# Patient Record
Sex: FEMALE | Race: WHITE | NOT HISPANIC OR LATINO | Employment: OTHER | URBAN - METROPOLITAN AREA
[De-identification: names, ages, dates, MRNs, and addresses within clinical notes are randomized per-mention and may not be internally consistent; named-entity substitution may affect disease eponyms.]

---

## 2017-01-09 DIAGNOSIS — G47.00 INSOMNIA, UNSPECIFIED TYPE: ICD-10-CM

## 2017-01-09 RX ORDER — ESZOPICLONE 3 MG/1
TABLET, FILM COATED ORAL
Qty: 30 TAB | Refills: 2 | Status: SHIPPED
Start: 2017-01-09 | End: 2017-03-03 | Stop reason: SDUPTHER

## 2017-01-09 NOTE — TELEPHONE ENCOUNTER
Please complete your note as I cannot close the encounter. You started this message over an hour ago.

## 2017-01-09 NOTE — TELEPHONE ENCOUNTER
Caller Name: Acacia Arboleda     Call Back Number: [phone] (home)     Patient approves a detailed voicemail message: yes     Have we ever prescribed this med? Yes. If yes, what date? 10/17/16     Last OV: 5/16/16 - Dr. Erickson    Next OV: 01/23/2017 - Angelic Leone    DX: insomnia

## 2017-01-09 NOTE — TELEPHONE ENCOUNTER
Caller Name: Acacia Arboleda                 Call Back Number: [phone] (home)         Patient approves a detailed voicemail message: yes    Have we ever prescribed this med? Yes.  If yes, what date? 10/17/16    Last OV: 5/16/16    Next OV: 1/23/17    DX: insomnia    Medications:  Current Outpatient Prescriptions   Medication Sig Dispense Refill   • DOCUSATE SODIUM PO Take  by mouth.     • atorvastatin (LIPITOR) 10 MG Tab Take 1 Tab by mouth every day. 90 Tab 0   • levothyroxine (SYNTHROID) 175 MCG Tab Take 1 Tab by mouth every day. 90 Tab 1   • Eszopiclone 3 MG Tab TAKE ONE TABLET BY MOUTH EVERY NIGHT AT BEDTIME AS NEEDED FOR SLEEP 30 Tab 2   • acyclovir (ZOVIRAX) 400 MG tablet Take 1 Tab by mouth 3 times a day. 21 Tab 0   • Benzocaine-Benzethonium 20-0.2 % Aerosol Apply as needed 1 Can 3   • loratadine (CLARITIN) 10 MG TABS Take 10 mg by mouth every day.     • MAGNESIUM PO Take  by mouth.     • Multiple Vitamins-Minerals (WOMENS 50+ MULTI VITAMIN/MIN PO) Take  by mouth.     • ascorbic acid (ASCORBIC ACID) 500 MG TABS Take 500 mg by mouth every day.     • VITAMIN E by Does not apply route.     • docosahexanoic acid (OMEGA 3 FA) 1000 MG CAPS Take 1,000 mg by mouth 3 times a day, with meals.     • Calcium Carbonate-Vitamin D (CALCIUM + D PO) Take  by mouth.     • Multiple Vitamins-Minerals (OCUVITE) TABS Take 1 Tab by mouth every day.     • Coenzyme Q10 (CO Q 10) 10 MG CAPS Take  by mouth.     • Cyanocobalamin (VITAMIN B12 PO) Take  by mouth.     • Naproxen Sodium (ALEVE PO) Take  by mouth.     • mometasone (NASONEX) 50 MCG/ACT nasal spray Spray 2 Sprays in nose every day. Each nostril 1 Inhaler 2   • NON SPECIFIED DIABETIC TEST STRIP (ONE TOUCH ULTRA) TEST 2-3 TIMES DAILY        No current facility-administered medications for this visit.

## 2017-02-10 ENCOUNTER — SLEEP CENTER VISIT (OUTPATIENT)
Dept: SLEEP MEDICINE | Facility: MEDICAL CENTER | Age: 62
End: 2017-02-10
Payer: COMMERCIAL

## 2017-02-10 VITALS
WEIGHT: 197 LBS | BODY MASS INDEX: 32.82 KG/M2 | HEIGHT: 65 IN | DIASTOLIC BLOOD PRESSURE: 72 MMHG | RESPIRATION RATE: 16 BRPM | SYSTOLIC BLOOD PRESSURE: 110 MMHG | HEART RATE: 77 BPM | OXYGEN SATURATION: 94 %

## 2017-02-10 DIAGNOSIS — F51.04 PSYCHOPHYSIOLOGICAL INSOMNIA: ICD-10-CM

## 2017-02-10 DIAGNOSIS — G47.33 OBSTRUCTIVE SLEEP APNEA: ICD-10-CM

## 2017-02-10 PROCEDURE — 99213 OFFICE O/P EST LOW 20 MIN: CPT | Performed by: INTERNAL MEDICINE

## 2017-02-10 NOTE — MR AVS SNAPSHOT
"        Acacia Arboleda   2/10/2017 11:20 AM   Sleep Center Visit   MRN: [id number]    Department:  [facility]   Dept Phone:  [phone]    Description:  Female : 1955   Provider:  Tere Erickson M.D.           Reason for Visit     Follow-Up JAYSHREE      Allergies as of 2/10/2017     Allergen Noted Reactions    Sulfa Drugs 2009         You were diagnosed with     Obstructive sleep apnea   [625530]       Psychophysiological insomnia   [412715]         Vital Signs     Blood Pressure Pulse Respirations Height Weight Body Mass Index    110/72 mmHg 77 16 1.651 m (5' 5\") 89.359 kg (197 lb) 32.78 kg/m2    Oxygen Saturation Last Menstrual Period Smoking Status             94% 1985 Former Smoker         Basic Information     Date Of Birth Sex Race Ethnicity Preferred Language    1955 Female White Non- English      Your appointments     Aug 16, 2017  1:20 PM   Follow UP with Tere Erickson M.D.   [facility] (--)    [address] [place] [id number]   [phone]              Problem List              ICD-10-CM Priority Class Noted - Resolved    Dyslipidemia E78.5   2009 - Present    Hypothyroid E03.9   2009 - Present    IBS (irritable bowel syndrome) K58.9   2009 - Present    GERD (gastroesophageal reflux disease) K21.9   2009 - Present    Herpes B00.9   2009 - Present    CHRONIC SINUSITIS    2009 - Present    Snoring R06.83   2013 - Present    Labial cyst N90.7   10/19/2015 - Present    Obstructive sleep apnea G47.33   2016 - Present    Insomnia G47.00   2016 - Present      Health Maintenance        Date Due Completion Dates    IMM ZOSTER VACCINE 3/11/2016 ---    MAMMOGRAM 2017, 10/2/2015, 2014, 2013, 2012, 2011, 2010, 9/10/2009, 9/10/2009, 2008, 2008, 2007, 2007, 10/7/2005, 10/6/2004    COLONOSCOPY 2021, " 11/19/2011 (Done)    Override on 11/19/2011: Done    IMM DTaP/Tdap/Td Vaccine (2 - Td) 10/6/2024 10/6/2014            Current Immunizations     Hepatitis A Vaccine, Adult 5/8/2015, 10/6/2014    Hepatitis B Vaccine Recombivax (Adol/Adult) 5/8/2015, 12/8/2014, 10/6/2014    Influenza Vaccine Quad Inj (Pf) 10/6/2014    Influenza Vaccine Quad Inj (Preserved) 10/26/2016, 10/19/2015    Tdap Vaccine 10/6/2014      Below and/or attached are the medications your provider expects you to take. Review all of your home medications and newly ordered medications with your provider and/or pharmacist. Follow medication instructions as directed by your provider and/or pharmacist. Please keep your medication list with you and share with your provider. Update the information when medications are discontinued, doses are changed, or new medications (including over-the-counter products) are added; and carry medication information at all times in the event of emergency situations     Allergies:  SULFA DRUGS - (reactions not documented)               Medications  Valid as of: February 10, 2017 - 11:56 AM    Generic Name Brand Name Tablet Size Instructions for use    Acyclovir (Tab) ZOVIRAX 400 MG Take 1 Tab by mouth 3 times a day.        Ascorbic Acid (Tab) ascorbic acid 500 MG Take 500 mg by mouth every day.        Atorvastatin Calcium (Tab) LIPITOR 10 MG Take 1 Tab by mouth every day.        Benzocaine-Benzethonium (Aerosol) Benzocaine-Benzethonium 20-0.2 % Apply as needed        Calcium Citrate-Vitamin D   Take  by mouth.        Coenzyme Q10 (Cap) Coenzyme Q10 10 MG Take  by mouth.        Cyanocobalamin   Take  by mouth.        Docusate Sodium   Take  by mouth.        Eszopiclone (Tab) Eszopiclone 3 MG TAKE ONE TABLET BY MOUTH AT BEDTIME AS NEEDED FOR SLEEP        Levothyroxine Sodium (Tab) SYNTHROID 175 MCG Take 1 Tab by mouth every day.        Loratadine (Tab) CLARITIN 10 MG Take 10 mg by mouth every day.        Magnesium   Take  by  mouth.        Mometasone Furoate (Suspension) NASONEX 50 MCG/ACT Spray 2 Sprays in nose every day. Each nostril        Multiple Vitamins-Minerals   Take  by mouth.        Multiple Vitamins-Minerals (Tab) OCUVITE  Take 1 Tab by mouth every day.        Naproxen Sodium   Take  by mouth.        NON SPECIFIED   DIABETIC TEST STRIP (ONE TOUCH ULTRA) TEST 2-3 TIMES DAILY         Omega-3 Fatty Acids (Cap) OMEGA 3 FA 1000 MG Take 1,000 mg by mouth 3 times a day, with meals.        Vitamin E   by Does not apply route.        .                 Medicines prescribed today were sent to:     [id number] - [place], [place] - [address]    [address] [place] [address]    Phone: [phone] Fax: [phone]    Open 24 Hours?: No    [facility] - [place], [place] - [address] AT PORTAL TO REG[address]ED [facility]    [address] [facility] [address]    Phone: [phone] Fax: [phone]    Open 24 Hours?: No      Medication refill instructions:       If your prescription bottle indicates you have medication refills left, it is not necessary to call your provider’s office. Please contact your pharmacy and they will refill your medication.    If your prescription bottle indicates you do not have any refills left, you may request refills at any time through one of the following ways: The online [employer] system (except Urgent Care), by calling your provider’s office, or by asking your pharmacy to contact your provider’s office with a refill request. Medication refills are processed only during regular business hours and may not be available until the next business day. Your provider may request additional information or to have a follow-up visit with you prior to refilling your medication.   *Please Note: Medication refills are assigned a new Rx number when refilled electronically. Your pharmacy may indicate that no refills were authorized even though a new prescription for the same  medication is available at the pharmacy. Please request the medicine by name with the pharmacy before contacting your provider for a refill.           MyChart Access Code: Activation code not generated  Current MyChart Status: Active

## 2017-02-10 NOTE — PROGRESS NOTES
Chief Complaint   Patient presents with   • Follow-Up     JAYSHREE       HPI: This patient is a 61 y.o. Female returns for follow-up of severe JAYSHREE and insomnia. Her polysomnogram showed AHI of 85 events per hour; she has been compliant with BiPAP 16/14 cm of water, with normal AHI of 2.8 per compliance card. She has 97% BiPAP usage for 6 hours nightly. She is seeing Dr. Vail for cognitive behavioral therapy and has been using sleep restriction therapy to help with her insomnia. She admits her sleep has improved with treatment. She continues to use Lunesta 3 mg daily at bedtime as a sleep aid.  Weight is down 13 pounds. Denies daytime hypersomnolence. She is using nasal pillows, denies difficulty with mask fit or leakage, however is overdue for replacement supplies. She is looking into switching DME's.   She has tried Ambien and Belsomra in the past without subjective benefit.    Past Medical History   Diagnosis Date   • Dyslipidemia 9/11/2009   • Herpes 9/11/2009   • CHRONIC SINUSITIS 9/11/2009   • IBS (irritable bowel syndrome) 9/11/2009   • GERD (gastroesophageal reflux disease) 9/11/2009   • Hypothyroid 9/11/2009   • Snoring 11/19/2013   • Arthritis    • Asthma    • Hemorrhoids    • Diabetes (CMS-[facility])    • Allergic rhinitis        Social History     Social History   • Marital Status:      Spouse Name: N/A   • Number of Children: N/A   • Years of Education: N/A     Occupational History   • Not on file.     Social History Main Topics   • Smoking status: Former Smoker -- 3.00 packs/day     Types: Cigarettes   • Smokeless tobacco: Never Used      Comment: QUIT 1986   • Alcohol Use: 0.0 oz/week     0 Standard drinks or equivalent per week      Comment: RARE   • Drug Use: No   • Sexual Activity:     Partners: Male     Other Topics Concern   • Not on file     Social History Narrative       Family History   Problem Relation Age of Onset   • Heart Disease Father      AAA   • Cancer Mother      UTERINE   • Heart  Disease Mother      AFIB   • Cancer Maternal Aunt      BREAST   • Cancer Maternal Uncle      COLON       Current Outpatient Prescriptions on File Prior to Visit   Medication Sig Dispense Refill   • Eszopiclone 3 MG Tab TAKE ONE TABLET BY MOUTH AT BEDTIME AS NEEDED FOR SLEEP 30 Tab 2   • DOCUSATE SODIUM PO Take  by mouth.     • levothyroxine (SYNTHROID) 175 MCG Tab Take 1 Tab by mouth every day. 90 Tab 1   • loratadine (CLARITIN) 10 MG TABS Take 10 mg by mouth every day.     • MAGNESIUM PO Take  by mouth.     • Multiple Vitamins-Minerals (WOMENS 50+ MULTI VITAMIN/MIN PO) Take  by mouth.     • ascorbic acid (ASCORBIC ACID) 500 MG TABS Take 500 mg by mouth every day.     • VITAMIN E by Does not apply route.     • docosahexanoic acid (OMEGA 3 FA) 1000 MG CAPS Take 1,000 mg by mouth 3 times a day, with meals.     • Calcium Carbonate-Vitamin D (CALCIUM + D PO) Take  by mouth.     • Multiple Vitamins-Minerals (OCUVITE) TABS Take 1 Tab by mouth every day.     • Coenzyme Q10 (CO Q 10) 10 MG CAPS Take  by mouth.     • Cyanocobalamin (VITAMIN B12 PO) Take  by mouth.     • Naproxen Sodium (ALEVE PO) Take  by mouth.     • mometasone (NASONEX) 50 MCG/ACT nasal spray Spray 2 Sprays in nose every day. Each nostril 1 Inhaler 2   • NON SPECIFIED DIABETIC TEST STRIP (ONE TOUCH ULTRA) TEST 2-3 TIMES DAILY      • atorvastatin (LIPITOR) 10 MG Tab Take 1 Tab by mouth every day. 90 Tab 0   • acyclovir (ZOVIRAX) 400 MG tablet Take 1 Tab by mouth 3 times a day. 21 Tab 0   • Benzocaine-Benzethonium 20-0.2 % Aerosol Apply as needed 1 Can 3     No current facility-administered medications on file prior to visit.       Allergies: Sulfa drugs    ROS:   Constitutional: Denies fevers, chills, night sweats, fatigue or weight loss  Eyes: Denies vision loss, pain, drainage, double vision  Ears, Nose, Throat: Denies earache, difficulty hearing, tinnitus, nasal congestion, hoarseness  Cardiovascular: Denies chest pain, tightness, palpitations, orthopnea  "or edema  Respiratory: Denies shortness of breath, cough, wheezing, hemoptysis  Sleep: As in history of present illness  GI: Denies heartburn, dysphagia, nausea, abdominal pain, diarrhea or constipation  : Denies frequent urination, hematuria, discharge or painful urination  Musculoskeletal: Denies back pain, painful joints, sore muscles  Neurological: Denies weakness or headaches  Skin: No rashes    Blood pressure 110/72, pulse 77, resp. rate 16, height 1.651 m (5' 5\"), weight 89.359 kg (197 lb), last menstrual period 03/01/1985, SpO2 94 %.  Multi-Ox Readings  Multi Ox #1     O2 sat % at rest     O2 sat % on exertion     O2 sat average on exertion     Multi Ox #2     O2 sat % at rest     O2 sat % on exertion     O2 sat average on exertion       Oxygen Use     Oxygen Frequency     Duration of need     Is the patient mobile within the home?     CPAP Use?     BIPAP Use? yes   Servo Titration         Physical Exam:  Appearance: Well-nourished, well-developed, in no acute distress  HEENT: Normocephalic, atraumatic, white sclera, PERRLA, oropharynx clear  Neck: No adenopathy or masses  Respiratory: no intercostal retractions or accessory muscle use  Lungs auscultation: Clear to auscultation bilaterally  Cardiovascular: Regular rate rhythm. No murmurs, rubs or gallops.  No LE edema  Abdomen: soft, nondistended  Gait: Normal  Digits: No clubbing, cyanosis  Motor: No focal deficits  Orientation: Oriented to time, person and place    Diagnosis:  1. Obstructive sleep apnea  DME BIPAP   2. Psychophysiological insomnia         Plan:  The patient shows excellent compliance and response to BiPAP therapy for treatment of severe JAYSHREE. Continue BiPAP 16/14 cm of water. She is looking into switching DME and prescription was provided for replacement CPAP supplies.  Continue cognitive behavioral therapy with Dr. Vail.  Continue Lunesta 3 mg by mouth daily at bedtime.  Good sleep hygiene and weight loss encouraged.  Return in about 6 " months (around 8/10/2017).

## 2017-03-03 DIAGNOSIS — G47.00 INSOMNIA, UNSPECIFIED TYPE: ICD-10-CM

## 2017-03-03 RX ORDER — ESZOPICLONE 3 MG/1
TABLET, FILM COATED ORAL
Qty: 30 TAB | Refills: 2 | Status: SHIPPED
Start: 2017-03-03 | End: 2017-03-06 | Stop reason: SDUPTHER

## 2017-03-04 NOTE — TELEPHONE ENCOUNTER
Have we ever prescribed this med? Yes.  If yes, what date? 1/9/17    Last OV: 2/10/17    Next OV: no appt on file- 6 month return    DX: insomnia    Medications:  Current Outpatient Prescriptions   Medication Sig Dispense Refill   • Eszopiclone 3 MG Tab TAKE ONE TABLET BY MOUTH AT BEDTIME AS NEEDED FOR SLEEP 30 Tab 2   • DOCUSATE SODIUM PO Take  by mouth.     • atorvastatin (LIPITOR) 10 MG Tab Take 1 Tab by mouth every day. 90 Tab 0   • levothyroxine (SYNTHROID) 175 MCG Tab Take 1 Tab by mouth every day. 90 Tab 1   • acyclovir (ZOVIRAX) 400 MG tablet Take 1 Tab by mouth 3 times a day. 21 Tab 0   • Benzocaine-Benzethonium 20-0.2 % Aerosol Apply as needed 1 Can 3   • loratadine (CLARITIN) 10 MG TABS Take 10 mg by mouth every day.     • MAGNESIUM PO Take  by mouth.     • Multiple Vitamins-Minerals (WOMENS 50+ MULTI VITAMIN/MIN PO) Take  by mouth.     • ascorbic acid (ASCORBIC ACID) 500 MG TABS Take 500 mg by mouth every day.     • VITAMIN E by Does not apply route.     • docosahexanoic acid (OMEGA 3 FA) 1000 MG CAPS Take 1,000 mg by mouth 3 times a day, with meals.     • Calcium Carbonate-Vitamin D (CALCIUM + D PO) Take  by mouth.     • Multiple Vitamins-Minerals (OCUVITE) TABS Take 1 Tab by mouth every day.     • Coenzyme Q10 (CO Q 10) 10 MG CAPS Take  by mouth.     • Cyanocobalamin (VITAMIN B12 PO) Take  by mouth.     • Naproxen Sodium (ALEVE PO) Take  by mouth.     • mometasone (NASONEX) 50 MCG/ACT nasal spray Spray 2 Sprays in nose every day. Each nostril 1 Inhaler 2   • NON SPECIFIED DIABETIC TEST STRIP (ONE TOUCH ULTRA) TEST 2-3 TIMES DAILY        No current facility-administered medications for this visit.

## 2017-03-06 RX ORDER — ESZOPICLONE 3 MG/1
TABLET, FILM COATED ORAL
Qty: 30 TAB | Refills: 2 | Status: SHIPPED
Start: 2017-03-06 | End: 2017-04-13 | Stop reason: SDUPTHER

## 2017-04-13 DIAGNOSIS — G47.00 INSOMNIA, UNSPECIFIED TYPE: ICD-10-CM

## 2017-04-13 RX ORDER — ESZOPICLONE 3 MG/1
TABLET, FILM COATED ORAL
Qty: 30 TAB | Refills: 2 | Status: SHIPPED
Start: 2017-04-13 | End: 2017-05-16 | Stop reason: SDUPTHER

## 2017-04-14 ENCOUNTER — HOSPITAL ENCOUNTER (OUTPATIENT)
Dept: LAB | Facility: MEDICAL CENTER | Age: 62
End: 2017-04-14
Attending: NURSE PRACTITIONER
Payer: COMMERCIAL

## 2017-04-14 DIAGNOSIS — E03.9 HYPOTHYROIDISM: ICD-10-CM

## 2017-04-14 DIAGNOSIS — Z00.00 PREVENTATIVE HEALTH CARE: ICD-10-CM

## 2017-04-14 DIAGNOSIS — E78.5 DYSLIPIDEMIA: ICD-10-CM

## 2017-04-14 LAB
ALBUMIN SERPL BCP-MCNC: 4.2 G/DL (ref 3.2–4.9)
ALBUMIN/GLOB SERPL: 1.8 G/DL
ALP SERPL-CCNC: 67 U/L (ref 30–99)
ALT SERPL-CCNC: 17 U/L (ref 2–50)
ANION GAP SERPL CALC-SCNC: 3 MMOL/L (ref 0–11.9)
AST SERPL-CCNC: 17 U/L (ref 12–45)
BILIRUB SERPL-MCNC: 0.8 MG/DL (ref 0.1–1.5)
BUN SERPL-MCNC: 17 MG/DL (ref 8–22)
CALCIUM SERPL-MCNC: 9.6 MG/DL (ref 8.5–10.5)
CHLORIDE SERPL-SCNC: 104 MMOL/L (ref 96–112)
CHOLEST SERPL-MCNC: 186 MG/DL (ref 100–199)
CO2 SERPL-SCNC: 30 MMOL/L (ref 20–33)
CREAT SERPL-MCNC: 0.77 MG/DL (ref 0.5–1.4)
EST. AVERAGE GLUCOSE BLD GHB EST-MCNC: 103 MG/DL
GFR SERPL CREATININE-BSD FRML MDRD: >60 ML/MIN/1.73 M 2
GLOBULIN SER CALC-MCNC: 2.4 G/DL (ref 1.9–3.5)
GLUCOSE SERPL-MCNC: 88 MG/DL (ref 65–99)
HBA1C MFR BLD: 5.2 % (ref 0–5.6)
HDLC SERPL-MCNC: 69 MG/DL
LDLC SERPL CALC-MCNC: 104 MG/DL
POTASSIUM SERPL-SCNC: 4.4 MMOL/L (ref 3.6–5.5)
PROT SERPL-MCNC: 6.6 G/DL (ref 6–8.2)
SODIUM SERPL-SCNC: 137 MMOL/L (ref 135–145)
TRIGL SERPL-MCNC: 65 MG/DL (ref 0–149)
TSH SERPL DL<=0.005 MIU/L-ACNC: 1.53 UIU/ML (ref 0.3–3.7)

## 2017-04-14 PROCEDURE — 84443 ASSAY THYROID STIM HORMONE: CPT

## 2017-04-14 PROCEDURE — 36415 COLL VENOUS BLD VENIPUNCTURE: CPT

## 2017-04-14 PROCEDURE — 83036 HEMOGLOBIN GLYCOSYLATED A1C: CPT

## 2017-04-14 PROCEDURE — 80061 LIPID PANEL: CPT

## 2017-04-14 PROCEDURE — 80053 COMPREHEN METABOLIC PANEL: CPT

## 2017-04-17 ENCOUNTER — TELEPHONE (OUTPATIENT)
Dept: MEDICAL GROUP | Facility: PHYSICIAN GROUP | Age: 62
End: 2017-04-17

## 2017-04-17 NOTE — TELEPHONE ENCOUNTER
----- Message from MARQUIS Mary sent at 4/16/2017  1:22 PM PDT -----  My chart message sent.  Labs are stable.  No changes to medication or treatment plan at this time.  Repeat labs in one year.  Thank you

## 2017-04-26 RX ORDER — LEVOTHYROXINE SODIUM 175 UG/1
TABLET ORAL
Qty: 90 TAB | Refills: 3 | Status: SHIPPED | OUTPATIENT
Start: 2017-04-26 | End: 2018-03-27 | Stop reason: SDUPTHER

## 2017-04-26 NOTE — TELEPHONE ENCOUNTER
Was the patient seen in the last year in this department? Yes     Does patient have an active prescription for medications requested? No     Received Request Via: Pharmacy      Pt met protocol?: Yes    TSH 4/17

## 2017-05-16 ENCOUNTER — TELEPHONE (OUTPATIENT)
Dept: PULMONOLOGY | Facility: HOSPICE | Age: 62
End: 2017-05-16

## 2017-05-16 DIAGNOSIS — G47.00 INSOMNIA, UNSPECIFIED TYPE: ICD-10-CM

## 2017-05-16 RX ORDER — ESZOPICLONE 3 MG/1
TABLET, FILM COATED ORAL
Qty: 90 TAB | Refills: 1 | Status: SHIPPED
Start: 2017-05-16 | End: 2017-12-29

## 2017-05-16 NOTE — TELEPHONE ENCOUNTER
Patient calling she now has [facility] mail order to save money. She is due for refill on Eszopiclone 3mg one at bedtime # 90 (print on plain paper for faxing) Last refill /13/17 and last office visit 2/10/17.

## 2017-08-17 ENCOUNTER — SLEEP CENTER VISIT (OUTPATIENT)
Dept: SLEEP MEDICINE | Facility: MEDICAL CENTER | Age: 62
End: 2017-08-17
Payer: COMMERCIAL

## 2017-08-17 VITALS
RESPIRATION RATE: 16 BRPM | SYSTOLIC BLOOD PRESSURE: 94 MMHG | DIASTOLIC BLOOD PRESSURE: 62 MMHG | HEART RATE: 70 BPM | BODY MASS INDEX: 32.82 KG/M2 | OXYGEN SATURATION: 91 % | WEIGHT: 197 LBS | HEIGHT: 65 IN

## 2017-08-17 DIAGNOSIS — G47.33 OSA (OBSTRUCTIVE SLEEP APNEA): ICD-10-CM

## 2017-08-17 DIAGNOSIS — F51.04 PSYCHOPHYSIOLOGICAL INSOMNIA: ICD-10-CM

## 2017-08-17 PROCEDURE — 99213 OFFICE O/P EST LOW 20 MIN: CPT | Performed by: INTERNAL MEDICINE

## 2017-08-17 NOTE — PROGRESS NOTES
Chief Complaint   Patient presents with   • Follow-Up     JAYSHREE   HPI: This patient is a 62 y.o. Female returns for follow-up of severe JAYSHREE and insomnia. Her polysomnogram showed AHI of 85 events per hour; she has been compliant with BiPAP 16/14 cm of water, with normal AHI of 2.6 per compliance card. She has 100% BiPAP usage for 6 hours nightly. She has seen Dr. Vail for cognitive behavioral therapy and has been using sleep restriction therapy for insomnia. She continues to use Lunesta 3 mg daily at bedtime as a sleep aid, although would like to wean off. She continues to struggle with periodic insomnia. She denies daytime hypersomnolence. She is using nasal pillows, denies difficulty with mask fit or leakage. Weight has been stable.  She has tried Ambien and Belsomra in the past without subjective benefit.        Past Medical History   Diagnosis Date   • Dyslipidemia 9/11/2009   • Herpes 9/11/2009   • CHRONIC SINUSITIS 9/11/2009   • IBS (irritable bowel syndrome) 9/11/2009   • GERD (gastroesophageal reflux disease) 9/11/2009   • Hypothyroid 9/11/2009   • Snoring 11/19/2013   • Arthritis    • Asthma    • Hemorrhoids    • Diabetes (CMS-[facility])    • Allergic rhinitis        Social History     Social History   • Marital Status:      Spouse Name: N/A   • Number of Children: N/A   • Years of Education: N/A     Occupational History   • Not on file.     Social History Main Topics   • Smoking status: Former Smoker -- 3.00 packs/day     Types: Cigarettes   • Smokeless tobacco: Never Used      Comment: QUIT 1986   • Alcohol Use: 0.0 oz/week     0 Standard drinks or equivalent per week      Comment: RARE   • Drug Use: No   • Sexual Activity:     Partners: Male     Other Topics Concern   • Not on file     Social History Narrative       Family History   Problem Relation Age of Onset   • Heart Disease Father      AAA   • Cancer Mother      UTERINE   • Heart Disease Mother      AFIB   • Cancer Maternal Aunt      BREAST   •  Cancer Maternal Uncle      COLON       Current Outpatient Prescriptions on File Prior to Visit   Medication Sig Dispense Refill   • Eszopiclone 3 MG Tab TAKE ONE TABLET BY MOUTH AT BEDTIME AS NEEDED FOR SLEEP 90 Tab 1   • levothyroxine (SYNTHROID) 175 MCG Tab Take 1 tablet daily 90 Tab 3   • DOCUSATE SODIUM PO Take  by mouth.     • loratadine (CLARITIN) 10 MG TABS Take 10 mg by mouth every day.     • Multiple Vitamins-Minerals (WOMENS 50+ MULTI VITAMIN/MIN PO) Take  by mouth.     • ascorbic acid (ASCORBIC ACID) 500 MG TABS Take 500 mg by mouth every day.     • VITAMIN E by Does not apply route.     • docosahexanoic acid (OMEGA 3 FA) 1000 MG CAPS Take 1,000 mg by mouth 3 times a day, with meals.     • Calcium Carbonate-Vitamin D (CALCIUM + D PO) Take  by mouth.     • Multiple Vitamins-Minerals (OCUVITE) TABS Take 1 Tab by mouth every day.     • Coenzyme Q10 (CO Q 10) 10 MG CAPS Take  by mouth.     • Cyanocobalamin (VITAMIN B12 PO) Take  by mouth.     • Naproxen Sodium (ALEVE PO) Take  by mouth.     • mometasone (NASONEX) 50 MCG/ACT nasal spray Spray 2 Sprays in nose every day. Each nostril 1 Inhaler 2   • NON SPECIFIED DIABETIC TEST STRIP (ONE TOUCH ULTRA) TEST 2-3 TIMES DAILY      • atorvastatin (LIPITOR) 10 MG Tab Take 1 Tab by mouth every day. 90 Tab 0   • acyclovir (ZOVIRAX) 400 MG tablet Take 1 Tab by mouth 3 times a day. 21 Tab 0   • Benzocaine-Benzethonium 20-0.2 % Aerosol Apply as needed 1 Can 3   • MAGNESIUM PO Take  by mouth.       No current facility-administered medications on file prior to visit.       Allergies: Sulfa drugs    ROS:   Constitutional: Denies fevers, chills, night sweats, fatigue or weight loss  Eyes: Denies vision loss, pain, drainage, double vision  Ears, Nose, Throat: Denies earache, difficulty hearing, tinnitus, nasal congestion, hoarseness  Cardiovascular: Denies chest pain, tightness, palpitations, orthopnea or edema  Respiratory: Denies shortness of breath, cough, wheezing,  "hemoptysis  Sleep: As in history of present illness  GI: Denies heartburn, dysphagia, nausea, abdominal pain, diarrhea or constipation  : Denies frequent urination, hematuria, discharge or painful urination  Musculoskeletal: Denies back pain, painful joints, sore muscles  Neurological: Denies weakness or headaches  Skin: No rashes    Blood pressure 94/62, pulse 70, resp. rate 16, height 1.651 m (5' 5\"), weight 89.359 kg (197 lb), last menstrual period 03/01/1985, SpO2 91 %.    Physical Exam:  Appearance: Well-nourished, well-developed, in no acute distress  HEENT: Normocephalic, atraumatic, white sclera, PERRLA, Mallampati 3  Neck: No adenopathy or masses  Respiratory: no intercostal retractions or accessory muscle use  Lungs auscultation: Clear to auscultation bilaterally  Cardiovascular: Regular rate rhythm. No murmurs, rubs or gallops.  No LE edema  Abdomen: soft, nondistended  Gait: Normal  Digits: No clubbing, cyanosis  Motor: No focal deficits  Orientation: Oriented to time, person and place    Diagnosis:  1. JAYSHREE (obstructive sleep apnea)     2. Psychophysiological insomnia     3. BMI 32.0-32.9,adult         Plan:  Acacia shows excellent compliance with BiPAP 16/14 cm water. She was encouraged to continue with treatment.  She struggles with chronic insomnia, and cognitive behavioral therapy was encouraged. She would like to try to wean Lunesta, and will cut back to half dosage daily at bedtime.  Weight loss encouraged.  Return in about 6 months (around 2/17/2018).        "

## 2017-08-17 NOTE — MR AVS SNAPSHOT
"        Acacia Arboleda   2017 1:40 PM   Sleep Center Visit   MRN: [id number]    Department:  [facility]   Dept Phone:  [phone]    Description:  Female : 1955   Provider:  Tere Erickson M.D.           Reason for Visit     Follow-Up JAYSHREE      Allergies as of 2017     Allergen Noted Reactions    Sulfa Drugs 2009         You were diagnosed with     JAYSHREE (obstructive sleep apnea)   [866744]       Psychophysiological insomnia   [575570]       BMI 32.0-32.9,adult   [342538]         Vital Signs     Blood Pressure Pulse Respirations Height Weight Body Mass Index    94/62 mmHg 70 16 1.651 m (5' 5\") 89.359 kg (197 lb) 32.78 kg/m2    Oxygen Saturation Last Menstrual Period Smoking Status             91% 1985 Former Smoker         Basic Information     Date Of Birth Sex Race Ethnicity Preferred Language    1955 Female White Non- English      Your appointments     2018 10:20 AM   Follow UP with Tere Erickson M.D.   [facility] (--)    [address] [place] [id number]   [phone]              Problem List              ICD-10-CM Priority Class Noted - Resolved    Dyslipidemia E78.5   2009 - Present    Hypothyroid E03.9   2009 - Present    IBS (irritable bowel syndrome) K58.9   2009 - Present    GERD (gastroesophageal reflux disease) K21.9   2009 - Present    Herpes B00.9   2009 - Present    CHRONIC SINUSITIS    2009 - Present    Snoring R06.83   2013 - Present    Labial cyst N90.7   10/19/2015 - Present    Obstructive sleep apnea G47.33   2016 - Present    Insomnia G47.00   2016 - Present      Health Maintenance        Date Due Completion Dates    IMM ZOSTER VACCINE 3/11/2016 ---    IMM INFLUENZA (1) 2017 10/26/2016, 10/19/2015, 10/6/2014    MAMMOGRAM 2017, 10/2/2015, 2014, 2013, 2012, 2011, 2010, 9/10/2009, 9/10/2009, " 7/29/2008, 7/29/2008, 7/25/2007, 7/25/2007, 10/7/2005, 10/6/2004    COLONOSCOPY 11/22/2021 11/22/2011, 11/19/2011 (Done)    Override on 11/19/2011: Done    IMM DTaP/Tdap/Td Vaccine (2 - Td) 10/6/2024 10/6/2014            Current Immunizations     Hepatitis A Vaccine, Adult 5/8/2015, 10/6/2014    Hepatitis B Vaccine Recombivax (Adol/Adult) 5/8/2015, 12/8/2014, 10/6/2014    Influenza Vaccine Quad Inj (Pf) 10/6/2014    Influenza Vaccine Quad Inj (Preserved) 10/26/2016, 10/19/2015    Tdap Vaccine 10/6/2014      Below and/or attached are the medications your provider expects you to take. Review all of your home medications and newly ordered medications with your provider and/or pharmacist. Follow medication instructions as directed by your provider and/or pharmacist. Please keep your medication list with you and share with your provider. Update the information when medications are discontinued, doses are changed, or new medications (including over-the-counter products) are added; and carry medication information at all times in the event of emergency situations     Allergies:  SULFA DRUGS - (reactions not documented)               Medications  Valid as of: August 17, 2017 -  2:09 PM    Generic Name Brand Name Tablet Size Instructions for use    Acyclovir (Tab) ZOVIRAX 400 MG Take 1 Tab by mouth 3 times a day.        Ascorbic Acid (Tab) ascorbic acid 500 MG Take 500 mg by mouth every day.        Atorvastatin Calcium (Tab) LIPITOR 10 MG Take 1 Tab by mouth every day.        Benzocaine-Benzethonium (Aerosol) Benzocaine-Benzethonium 20-0.2 % Apply as needed        Calcium Citrate-Vitamin D   Take  by mouth.        Coenzyme Q10 (Cap) Coenzyme Q10 10 MG Take  by mouth.        Cyanocobalamin   Take  by mouth.        Docusate Sodium   Take  by mouth.        Eszopiclone (Tab) Eszopiclone 3 MG TAKE ONE TABLET BY MOUTH AT BEDTIME AS NEEDED FOR SLEEP        Levothyroxine Sodium (Tab) SYNTHROID 175 MCG Take 1 tablet daily         Loratadine (Tab) CLARITIN 10 MG Take 10 mg by mouth every day.        Magnesium   Take  by mouth.        Mometasone Furoate (Suspension) NASONEX 50 MCG/ACT Spray 2 Sprays in nose every day. Each nostril        Multiple Vitamins-Minerals   Take  by mouth.        Multiple Vitamins-Minerals (Tab) OCUVITE  Take 1 Tab by mouth every day.        Naproxen Sodium   Take  by mouth.        NON SPECIFIED   DIABETIC TEST STRIP (ONE TOUCH ULTRA) TEST 2-3 TIMES DAILY         Omega-3 Fatty Acids (Cap) OMEGA 3 FA 1000 MG Take 1,000 mg by mouth 3 times a day, with meals.        Vitamin E   by Does not apply route.        .                 Medicines prescribed today were sent to:     [id number] \[facility] [place] [facility]", [place] - [address]    [address] [facility] [address]    Phone: [phone] Fax: [phone]    Open 24 Hours?: No    [facility] - [address] [facility] [address]    Phone: [phone] Fax: [phone]    Open 24 Hours?: No      Medication refill instructions:       If your prescription bottle indicates you have medication refills left, it is not necessary to call your provider’s office. Please contact your pharmacy and they will refill your medication.    If your prescription bottle indicates you do not have any refills left, you may request refills at any time through one of the following ways: The online [employer] system (except Urgent Care), by calling your provider’s office, or by asking your pharmacy to contact your provider’s office with a refill request. Medication refills are processed only during regular business hours and may not be available until the next business day. Your provider may request additional information or to have a follow-up visit with you prior to refilling your medication.   *Please Note: Medication refills are assigned a new Rx number when refilled electronically. Your pharmacy may indicate that no refills were  authorized even though a new prescription for the same medication is available at the pharmacy. Please request the medicine by name with the pharmacy before contacting your provider for a refill.           [employer] Access Code: Activation code not generated  Current Cloud Cruiser Status: Active

## 2017-11-16 ENCOUNTER — OFFICE VISIT (OUTPATIENT)
Dept: MEDICAL GROUP | Facility: PHYSICIAN GROUP | Age: 62
End: 2017-11-16
Payer: COMMERCIAL

## 2017-11-16 VITALS
OXYGEN SATURATION: 98 % | TEMPERATURE: 97.7 F | RESPIRATION RATE: 16 BRPM | WEIGHT: 207.8 LBS | SYSTOLIC BLOOD PRESSURE: 126 MMHG | DIASTOLIC BLOOD PRESSURE: 88 MMHG | HEIGHT: 65 IN | HEART RATE: 65 BPM | BODY MASS INDEX: 34.62 KG/M2

## 2017-11-16 DIAGNOSIS — K58.2 IRRITABLE BOWEL SYNDROME WITH BOTH CONSTIPATION AND DIARRHEA: ICD-10-CM

## 2017-11-16 DIAGNOSIS — E66.9 OBESITY (BMI 30-39.9): ICD-10-CM

## 2017-11-16 DIAGNOSIS — E78.5 DYSLIPIDEMIA: ICD-10-CM

## 2017-11-16 DIAGNOSIS — E03.8 OTHER SPECIFIED HYPOTHYROIDISM: ICD-10-CM

## 2017-11-16 PROCEDURE — 99214 OFFICE O/P EST MOD 30 MIN: CPT | Performed by: NURSE PRACTITIONER

## 2017-11-16 ASSESSMENT — PATIENT HEALTH QUESTIONNAIRE - PHQ9: CLINICAL INTERPRETATION OF PHQ2 SCORE: 0

## 2017-11-16 NOTE — ASSESSMENT & PLAN NOTE
Patient has IBS with constipation. Last week no BM but had a few hard pebble like stools.  Last BM Monday the 13th after 2 dulcolax  Laxative. Had a large bowel movement on Tuesday, then felt a large mass in the vagina which she described as a softball, which decreased to tennis ball, then is gradually decreasing. She is unable to pass flatus. She also reports having to do digital disimpaction. She takes prebiotics, probiotics, fiber supplements and stool softners. Discussed eating high fiber foods.

## 2017-11-16 NOTE — PATIENT INSTRUCTIONS
Polyethylene Glycol powder  What is this medicine?  POLYETHYLENE GLYCOL 3350 (filippo ee ETH i eddi; GLYE col) powder is a laxative used to treat constipation. It increases the amount of water in the stool. Bowel movements become easier and more frequent.  This medicine may be used for other purposes; ask your health care provider or pharmacist if you have questions.  COMMON BRAND NAME(S): GaviLax, GlycoLax, MiraLax, [employer] [facility]   What should I tell my health care provider before I take this medicine?  They need to know if you have any of these conditions:  -a history of blockage of the stomach or intestine  -current abdomen distension or pain  -difficulty swallowing  -diverticulitis, ulcerative colitis, or other chronic bowel disease  -phenylketonuria  -an unusual or allergic reaction to polyethylene glycol, other medicines, dyes, or preservatives  -pregnant or trying to get pregnant  -breast-feeding  How should I use this medicine?  Take this medicine by mouth. The bottle has a measuring cap that is marked with a line. Pour the powder into the cap up to the marked line (the dose is about 1 heaping tablespoon). Add the powder in the cap to a full glass (4 to 8 ounces or 120 to 240 ml) of water, juice, soda, coffee or tea. Mix the powder well. Drink the solution. Take exactly as directed. Do not take your medicine more often than directed.  Talk to your pediatrician regarding the use of this medicine in children. Special care may be needed.  Overdosage: If you think you have taken too much of this medicine contact a poison control center or emergency room at once.  NOTE: This medicine is only for you. Do not share this medicine with others.  What if I miss a dose?  If you miss a dose, take it as soon as you can. If it is almost time for your next dose, take only that dose. Do not take double or extra doses.  What may interact with this medicine?  Interactions are not expected.  This list may not describe all possible  interactions. Give your health care provider a list of all the medicines, herbs, non-prescription drugs, or dietary supplements you use. Also tell them if you smoke, drink alcohol, or use illegal drugs. Some items may interact with your medicine.  What should I watch for while using this medicine?  Do not use for more than 2 weeks without advice from your doctor or health care professional. It can take 2 to 4 days to have a bowel movement and to experience improvement in constipation. See your health care professional for any changes in bowel habits, including constipation, that are severe or last longer than three weeks.  Always take this medicine with plenty of water.  What side effects may I notice from receiving this medicine?  Side effects that you should report to your doctor or health care professional as soon as possible:  -diarrhea  -difficulty breathing  -itching of the skin, hives, or skin rash  -severe bloating, pain, or distension of the stomach  -vomiting  Side effects that usually do not require medical attention (report to your doctor or health care professional if they continue or are bothersome):  -bloating or gas  -lower abdominal discomfort or cramps  -nausea  This list may not describe all possible side effects. Call your doctor for medical advice about side effects. You may report side effects to FDA at 4-302-FDA-6990.  Where should I keep my medicine?  Keep out of the reach of children.  Store between 15 and 30 degrees C (59 and 86 degrees F). Throw away any unused medicine after the expiration date.  NOTE: This sheet is a summary. It may not cover all possible information. If you have questions about this medicine, talk to your doctor, pharmacist, or health care provider.  © 2014, Elsevier/Gold Standard. (7/20/2009 4:50:45 PM)

## 2017-11-16 NOTE — PROGRESS NOTES
Subjective:     Chief Complaint   Patient presents with   • Labs Only   • Other     Vaginal problem    Previous patient of Martha Marshall    HPI:  Acacia Arboleda is a 62 y.o. female here today to discuss the following:    IBS (Irritable Bowel Syndrome)  Patient has IBS with constipation. Last week no BM but had a few hard pebble like stools.  Last BM Monday the 13th after 2 dulcolax  Laxative. Had a large bowel movement on Tuesday, then felt a large mass in the vagina which she described as a softball, which decreased to tennis ball, then is gradually decreasing. She is unable to pass flatus. She also reports having to do digital disimpaction. She takes prebiotics, probiotics, fiber supplements and stool softners. Discussed eating high fiber foods.     Current medicines (including changes today)  Current Outpatient Prescriptions   Medication Sig Dispense Refill   • Eszopiclone 3 MG Tab TAKE ONE TABLET BY MOUTH AT BEDTIME AS NEEDED FOR SLEEP 90 Tab 1   • levothyroxine (SYNTHROID) 175 MCG Tab Take 1 tablet daily 90 Tab 3   • DOCUSATE SODIUM PO Take  by mouth.     • atorvastatin (LIPITOR) 10 MG Tab Take 1 Tab by mouth every day. 90 Tab 0   • acyclovir (ZOVIRAX) 400 MG tablet Take 1 Tab by mouth 3 times a day. 21 Tab 0   • Benzocaine-Benzethonium 20-0.2 % Aerosol Apply as needed 1 Can 3   • loratadine (CLARITIN) 10 MG TABS Take 10 mg by mouth every day.     • MAGNESIUM PO Take  by mouth.     • Multiple Vitamins-Minerals (WOMENS 50+ MULTI VITAMIN/MIN PO) Take  by mouth.     • ascorbic acid (ASCORBIC ACID) 500 MG TABS Take 500 mg by mouth every day.     • VITAMIN E by Does not apply route.     • docosahexanoic acid (OMEGA 3 FA) 1000 MG CAPS Take 1,000 mg by mouth 3 times a day, with meals.     • Calcium Carbonate-Vitamin D (CALCIUM + D PO) Take  by mouth.     • Multiple Vitamins-Minerals (OCUVITE) TABS Take 1 Tab by mouth every day.     • Coenzyme Q10 (CO Q 10) 10 MG CAPS Take  by mouth.     • Cyanocobalamin  "(VITAMIN B12 PO) Take  by mouth.     • Naproxen Sodium (ALEVE PO) Take  by mouth.     • mometasone (NASONEX) 50 MCG/ACT nasal spray Spray 2 Sprays in nose every day. Each nostril 1 Inhaler 2   • NON SPECIFIED DIABETIC TEST STRIP (ONE TOUCH ULTRA) TEST 2-3 TIMES DAILY        No current facility-administered medications for this visit.        She  has a past medical history of Allergic rhinitis; Arthritis; Asthma; CHRONIC SINUSITIS (9/11/2009); Diabetes (CMS-[facility]); Dyslipidemia (9/11/2009); GERD (gastroesophageal reflux disease) (9/11/2009); Hemorrhoids; Herpes (9/11/2009); Hypothyroid (9/11/2009); IBS (irritable bowel syndrome) (9/11/2009); and Snoring (11/19/2013).    ROS   Review of Systems   Constitutional: Negative for fever, chills, weight loss and malaise/fatigue.   HENT: Negative for ear pain, nosebleeds, congestion, sore throat and neck pain.    Respiratory: Negative for cough, sputum production, shortness of breath and wheezing.    Cardiovascular: Negative for chest pain, palpitations,  and leg swelling.   Gastrointestinal: Negative for heartburn, nausea, vomiting, diarrhea and abdominal pain. Positive constipation  Neurological: Negative for dizziness, tingling, tremors, sensory change, focal weakness and headaches.   Psychiatric/Behavioral: Negative for depression, anxiety, suicidal ideas, insomnia and memory loss.    All other systems reviewed and are negative except as in HPI.     Objective:   Physical Exam:  Blood pressure 126/88, pulse 65, temperature 36.5 °C (97.7 °F), resp. rate 16, height 1.651 m (5' 5\"), weight 94.3 kg (207 lb 12.8 oz), last menstrual period 03/01/1985, SpO2 98 %. Body mass index is 34.58 kg/m².   General:  Well nourished, well developed in NAD  Head is grossly normal.  Neck: Supple without JVD   Pulmonary:  Normal effort.  Cardiovascular: Regular rate   Extremities: no clubbing, cyanosis, or edema.Abdomen soft, non distended, no tenderness. No CVAT. No suprapubic " tenderness.  Perineum and external genitalia normal without rash.  Vagina atrophied with scant discharge, Bladder palpated, but not visible in the vaginal vault Cervix absent  Rectal:Normal sphincter tone. Rectal wall smooth. One small pellet of hard stool was guaiac negative.  Ext: no edema, color normal, vascularity normal, temperature normal  MS: Normal gait and station    Assessment and Plan:   The following treatment plan was discussed   1. Irritable bowel syndrome with both constipation and diarrhea      Miralax as directed   2. Dyslipidemia  COMP METABOLIC PANEL    LIPID PROFILE    Labs ordered   3. Other specified hypothyroidism  TSH WITH REFLEX TO FT4    labs ordered   4. Obesity (BMI 30-39.9)  Patient identified as having weight management issue.  Appropriate orders and counseling given.    Continue to monitor for prolapsed bladder    Followup: Return in about 2 months (around 1/16/2018), or if symptoms worsen or fail to improve, for establish, AWV/HRA.   Please note that this dictation was created using voice recognition software. I have made every reasonable attempt to correct obvious errors, but I expect that there are errors of grammar and possibly content that I did not discover before finalizing the note.

## 2017-12-05 ENCOUNTER — HOSPITAL ENCOUNTER (OUTPATIENT)
Dept: RADIOLOGY | Facility: MEDICAL CENTER | Age: 62
End: 2017-12-05
Attending: NURSE PRACTITIONER
Payer: COMMERCIAL

## 2017-12-05 ENCOUNTER — HOSPITAL ENCOUNTER (OUTPATIENT)
Dept: LAB | Facility: MEDICAL CENTER | Age: 62
End: 2017-12-05
Attending: NURSE PRACTITIONER
Payer: COMMERCIAL

## 2017-12-05 DIAGNOSIS — E03.8 OTHER SPECIFIED HYPOTHYROIDISM: ICD-10-CM

## 2017-12-05 DIAGNOSIS — Z12.31 VISIT FOR SCREENING MAMMOGRAM: ICD-10-CM

## 2017-12-05 DIAGNOSIS — E78.5 DYSLIPIDEMIA: ICD-10-CM

## 2017-12-05 LAB
ALBUMIN SERPL BCP-MCNC: 4.2 G/DL (ref 3.2–4.9)
ALBUMIN/GLOB SERPL: 1.6 G/DL
ALP SERPL-CCNC: 56 U/L (ref 30–99)
ALT SERPL-CCNC: 17 U/L (ref 2–50)
ANION GAP SERPL CALC-SCNC: 9 MMOL/L (ref 0–11.9)
AST SERPL-CCNC: 18 U/L (ref 12–45)
BILIRUB SERPL-MCNC: 0.7 MG/DL (ref 0.1–1.5)
BUN SERPL-MCNC: 22 MG/DL (ref 8–22)
CALCIUM SERPL-MCNC: 9.6 MG/DL (ref 8.5–10.5)
CHLORIDE SERPL-SCNC: 101 MMOL/L (ref 96–112)
CHOLEST SERPL-MCNC: 195 MG/DL (ref 100–199)
CO2 SERPL-SCNC: 27 MMOL/L (ref 20–33)
CREAT SERPL-MCNC: 0.73 MG/DL (ref 0.5–1.4)
GFR SERPL CREATININE-BSD FRML MDRD: >60 ML/MIN/1.73 M 2
GLOBULIN SER CALC-MCNC: 2.6 G/DL (ref 1.9–3.5)
GLUCOSE SERPL-MCNC: 78 MG/DL (ref 65–99)
HDLC SERPL-MCNC: 70 MG/DL
LDLC SERPL CALC-MCNC: 111 MG/DL
POTASSIUM SERPL-SCNC: 3.7 MMOL/L (ref 3.6–5.5)
PROT SERPL-MCNC: 6.8 G/DL (ref 6–8.2)
SODIUM SERPL-SCNC: 137 MMOL/L (ref 135–145)
TRIGL SERPL-MCNC: 70 MG/DL (ref 0–149)
TSH SERPL DL<=0.005 MIU/L-ACNC: 1.1 UIU/ML (ref 0.3–3.7)

## 2017-12-05 PROCEDURE — 80061 LIPID PANEL: CPT

## 2017-12-05 PROCEDURE — 36415 COLL VENOUS BLD VENIPUNCTURE: CPT

## 2017-12-05 PROCEDURE — 84443 ASSAY THYROID STIM HORMONE: CPT

## 2017-12-05 PROCEDURE — G0202 SCR MAMMO BI INCL CAD: HCPCS

## 2017-12-05 PROCEDURE — 80053 COMPREHEN METABOLIC PANEL: CPT

## 2017-12-18 ENCOUNTER — TELEPHONE (OUTPATIENT)
Dept: MEDICAL GROUP | Facility: PHYSICIAN GROUP | Age: 62
End: 2017-12-18

## 2017-12-18 NOTE — TELEPHONE ENCOUNTER
----- Message from Acacia Arboleda sent at 12/17/2017  3:42 PM PST -----  Regarding: Non-Urgent Medical Question  Contact: [phone]  I have scheduled an appt. for 12/21.  I continue to have abdominal & intestinal problems.  I am in pain, severe at times.  I am intaking very little food & liquids.  Pain is constantly waking me during the night.  If there is any chance to get in ealier than 12/21, I would appreciate a call.  Thank you, Acacia Arboleda [phone] home [phone] cell.

## 2017-12-19 ENCOUNTER — HOSPITAL ENCOUNTER (OUTPATIENT)
Dept: LAB | Facility: MEDICAL CENTER | Age: 62
End: 2017-12-19
Attending: NURSE PRACTITIONER
Payer: COMMERCIAL

## 2017-12-19 ENCOUNTER — OFFICE VISIT (OUTPATIENT)
Dept: MEDICAL GROUP | Facility: PHYSICIAN GROUP | Age: 62
End: 2017-12-19
Payer: COMMERCIAL

## 2017-12-19 ENCOUNTER — HOSPITAL ENCOUNTER (OUTPATIENT)
Dept: RADIOLOGY | Facility: MEDICAL CENTER | Age: 62
End: 2017-12-19
Attending: NURSE PRACTITIONER
Payer: COMMERCIAL

## 2017-12-19 ENCOUNTER — TELEPHONE (OUTPATIENT)
Dept: MEDICAL GROUP | Facility: PHYSICIAN GROUP | Age: 62
End: 2017-12-19

## 2017-12-19 VITALS
RESPIRATION RATE: 16 BRPM | HEIGHT: 65 IN | HEART RATE: 76 BPM | DIASTOLIC BLOOD PRESSURE: 70 MMHG | BODY MASS INDEX: 33.32 KG/M2 | WEIGHT: 200 LBS | SYSTOLIC BLOOD PRESSURE: 100 MMHG | TEMPERATURE: 97.6 F | OXYGEN SATURATION: 97 %

## 2017-12-19 DIAGNOSIS — R10.84 GENERALIZED ABDOMINAL PAIN: ICD-10-CM

## 2017-12-19 DIAGNOSIS — K57.32 DIVERTICULITIS OF LARGE INTESTINE WITHOUT PERFORATION OR ABSCESS WITHOUT BLEEDING: ICD-10-CM

## 2017-12-19 LAB
ALBUMIN SERPL BCP-MCNC: 3.8 G/DL (ref 3.2–4.9)
ALBUMIN/GLOB SERPL: 1.1 G/DL
ALP SERPL-CCNC: 63 U/L (ref 30–99)
ALT SERPL-CCNC: 15 U/L (ref 2–50)
ANION GAP SERPL CALC-SCNC: 10 MMOL/L (ref 0–11.9)
APPEARANCE UR: CLEAR
AST SERPL-CCNC: 18 U/L (ref 12–45)
BASOPHILS # BLD AUTO: 1.1 % (ref 0–1.8)
BASOPHILS # BLD: 0.07 K/UL (ref 0–0.12)
BILIRUB SERPL-MCNC: 0.8 MG/DL (ref 0.1–1.5)
BILIRUB UR QL STRIP.AUTO: NEGATIVE
BUN SERPL-MCNC: 12 MG/DL (ref 8–22)
CALCIUM SERPL-MCNC: 9.7 MG/DL (ref 8.5–10.5)
CHLORIDE SERPL-SCNC: 100 MMOL/L (ref 96–112)
CO2 SERPL-SCNC: 26 MMOL/L (ref 20–33)
COLOR UR: YELLOW
CREAT SERPL-MCNC: 0.69 MG/DL (ref 0.5–1.4)
CULTURE IF INDICATED INDCX: NO UA CULTURE
EOSINOPHIL # BLD AUTO: 0.13 K/UL (ref 0–0.51)
EOSINOPHIL NFR BLD: 2 % (ref 0–6.9)
ERYTHROCYTE [DISTWIDTH] IN BLOOD BY AUTOMATED COUNT: 41.9 FL (ref 35.9–50)
GFR SERPL CREATININE-BSD FRML MDRD: >60 ML/MIN/1.73 M 2
GLOBULIN SER CALC-MCNC: 3.4 G/DL (ref 1.9–3.5)
GLUCOSE SERPL-MCNC: 102 MG/DL (ref 65–99)
GLUCOSE UR STRIP.AUTO-MCNC: NEGATIVE MG/DL
HCT VFR BLD AUTO: 43.5 % (ref 37–47)
HGB BLD-MCNC: 14.4 G/DL (ref 12–16)
IMM GRANULOCYTES # BLD AUTO: 0.02 K/UL (ref 0–0.11)
IMM GRANULOCYTES NFR BLD AUTO: 0.3 % (ref 0–0.9)
KETONES UR STRIP.AUTO-MCNC: NEGATIVE MG/DL
LEUKOCYTE ESTERASE UR QL STRIP.AUTO: NEGATIVE
LYMPHOCYTES # BLD AUTO: 1.58 K/UL (ref 1–4.8)
LYMPHOCYTES NFR BLD: 24.2 % (ref 22–41)
MCH RBC QN AUTO: 29.3 PG (ref 27–33)
MCHC RBC AUTO-ENTMCNC: 33.1 G/DL (ref 33.6–35)
MCV RBC AUTO: 88.4 FL (ref 81.4–97.8)
MICRO URNS: NORMAL
MONOCYTES # BLD AUTO: 0.59 K/UL (ref 0–0.85)
MONOCYTES NFR BLD AUTO: 9 % (ref 0–13.4)
NEUTROPHILS # BLD AUTO: 4.14 K/UL (ref 2–7.15)
NEUTROPHILS NFR BLD: 63.4 % (ref 44–72)
NITRITE UR QL STRIP.AUTO: NEGATIVE
NRBC # BLD AUTO: 0 K/UL
NRBC BLD-RTO: 0 /100 WBC
PH UR STRIP.AUTO: 6.5 [PH]
PLATELET # BLD AUTO: 239 K/UL (ref 164–446)
PMV BLD AUTO: 11.7 FL (ref 9–12.9)
POTASSIUM SERPL-SCNC: 4.3 MMOL/L (ref 3.6–5.5)
PROT SERPL-MCNC: 7.2 G/DL (ref 6–8.2)
PROT UR QL STRIP: NEGATIVE MG/DL
RBC # BLD AUTO: 4.92 M/UL (ref 4.2–5.4)
RBC UR QL AUTO: NEGATIVE
SODIUM SERPL-SCNC: 136 MMOL/L (ref 135–145)
SP GR UR STRIP.AUTO: 1
UROBILINOGEN UR STRIP.AUTO-MCNC: 1 MG/DL
WBC # BLD AUTO: 6.5 K/UL (ref 4.8–10.8)

## 2017-12-19 PROCEDURE — 74177 CT ABD & PELVIS W/CONTRAST: CPT

## 2017-12-19 PROCEDURE — 700117 HCHG RX CONTRAST REV CODE 255: Performed by: NURSE PRACTITIONER

## 2017-12-19 PROCEDURE — 99214 OFFICE O/P EST MOD 30 MIN: CPT | Performed by: NURSE PRACTITIONER

## 2017-12-19 PROCEDURE — 80053 COMPREHEN METABOLIC PANEL: CPT

## 2017-12-19 PROCEDURE — 36415 COLL VENOUS BLD VENIPUNCTURE: CPT

## 2017-12-19 PROCEDURE — 81003 URINALYSIS AUTO W/O SCOPE: CPT

## 2017-12-19 PROCEDURE — 85025 COMPLETE CBC W/AUTO DIFF WBC: CPT

## 2017-12-19 RX ORDER — CIPROFLOXACIN 500 MG/1
500 TABLET, FILM COATED ORAL 2 TIMES DAILY
Qty: 20 TAB | Refills: 0 | Status: SHIPPED | OUTPATIENT
Start: 2017-12-19 | End: 2017-12-29

## 2017-12-19 RX ORDER — METRONIDAZOLE 500 MG/1
500 TABLET ORAL 2 TIMES DAILY
Qty: 20 TAB | Refills: 0 | Status: SHIPPED | OUTPATIENT
Start: 2017-12-19 | End: 2017-12-29

## 2017-12-19 RX ADMIN — IOHEXOL 100 ML: 350 INJECTION, SOLUTION INTRAVENOUS at 16:30

## 2017-12-19 NOTE — ASSESSMENT & PLAN NOTE
Patient reports generalized abdominal pain from epigastric to the suprapubic area for 5 days. She has been having abdominal pain for more than 3 months and states that she was sent for colonoscopy, but gastroenterology did not feel it was necessary at this time. She denies any fever, but has chills. She has a combination of diarrhea constipation. She had dry heaves and anorexia. Difficulty passing flatus

## 2017-12-19 NOTE — PROGRESS NOTES
Subjective:     Chief Complaint   Patient presents with   • Abdominal Pain     x5days   • Nausea   • Weight Loss       HPI:  Acacia Arboleda is a 62 y.o. female here today to discuss the following:    Generalized abdominal pain  Patient reports generalized abdominal pain from epigastric to the suprapubic area for 5 days. She has been having abdominal pain for more than 3 months and states that she was sent for colonoscopy, but gastroenterology did not feel it was necessary at this time. She denies any fever, but has chills. She has a combination of diarrhea constipation. She had dry heaves and anorexia. Difficulty passing flatus       Current medicines (including changes today)  Current Outpatient Prescriptions   Medication Sig Dispense Refill   • ciprofloxacin (CIPRO) 500 MG Tab Take 1 Tab by mouth 2 times a day for 10 days. 20 Tab 0   • metronidazole (FLAGYL) 500 MG Tab Take 1 Tab by mouth 2 Times a Day for 10 days. 20 Tab 0   • Eszopiclone 3 MG Tab TAKE ONE TABLET BY MOUTH AT BEDTIME AS NEEDED FOR SLEEP 90 Tab 1   • levothyroxine (SYNTHROID) 175 MCG Tab Take 1 tablet daily 90 Tab 3   • DOCUSATE SODIUM PO Take  by mouth.     • Benzocaine-Benzethonium 20-0.2 % Aerosol Apply as needed 1 Can 3   • loratadine (CLARITIN) 10 MG TABS Take 10 mg by mouth every day.     • Multiple Vitamins-Minerals (WOMENS 50+ MULTI VITAMIN/MIN PO) Take  by mouth.     • ascorbic acid (ASCORBIC ACID) 500 MG TABS Take 500 mg by mouth every day.     • VITAMIN E by Does not apply route.     • docosahexanoic acid (OMEGA 3 FA) 1000 MG CAPS Take 1,000 mg by mouth 3 times a day, with meals.     • Calcium Carbonate-Vitamin D (CALCIUM + D PO) Take  by mouth.     • Multiple Vitamins-Minerals (OCUVITE) TABS Take 1 Tab by mouth every day.     • Coenzyme Q10 (CO Q 10) 10 MG CAPS Take  by mouth.     • Cyanocobalamin (VITAMIN B12 PO) Take  by mouth.     • Naproxen Sodium (ALEVE PO) Take  by mouth.     • mometasone (NASONEX) 50 MCG/ACT nasal spray  "Spray 2 Sprays in nose every day. Each nostril 1 Inhaler 2   • NON SPECIFIED DIABETIC TEST STRIP (ONE TOUCH ULTRA) TEST 2-3 TIMES DAILY      • atorvastatin (LIPITOR) 10 MG Tab Take 1 Tab by mouth every day. 90 Tab 0   • acyclovir (ZOVIRAX) 400 MG tablet Take 1 Tab by mouth 3 times a day. 21 Tab 0   • MAGNESIUM PO Take  by mouth.       No current facility-administered medications for this visit.        She  has a past medical history of Allergic rhinitis; Arthritis; Asthma; CHRONIC SINUSITIS (9/11/2009); Diabetes (CMS-[facility]); Dyslipidemia (9/11/2009); GERD (gastroesophageal reflux disease) (9/11/2009); Hemorrhoids; Herpes (9/11/2009); Hypothyroid (9/11/2009); IBS (irritable bowel syndrome) (9/11/2009); and Snoring (11/19/2013).    ROS   Review of Systems   Constitutional: Positive for  chills, 10 pound weight loss in 5 days and malaise/fatigue.   HENT: Negative for ear pain, nosebleeds, congestion, sore throat and neck pain.    Respiratory: Negative for cough, sputum production, shortness of breath and wheezing.    Cardiovascular: Negative for chest pain, palpitations,  and leg swelling.   Gastrointestinal: Positive for heartburn, nausea, vomiting, diarrhea and abdominal pain.   : Positive for intermittent brown hazy urine  Neurological: Negative for dizziness, tingling, tremors, sensory change, focal weakness and headaches.   Psychiatric/Behavioral: Negative for depression, anxiety, suicidal ideas, insomnia and memory loss.    All other systems reviewed and are negative except as in HPI.   Objective:   Physical Exam:  Blood pressure 100/70, pulse 76, temperature 36.4 °C (97.6 °F), resp. rate 16, height 1.651 m (5' 5\"), weight 90.7 kg (200 lb), last menstrual period 03/01/1985, SpO2 97 %. Body mass index is 33.28 kg/m².   Physical Exam:  Alert, oriented in no acute distress.  Eye contact is good, speech goal directed, affect calm  HEENT: conjunctiva non-injected, sclera non-icteric.  Pinna normal.   Neck No " adenopathy or masses in the neck or supraclavicular regions.  Lungs: clear to auscultation bilaterally with good excursion.  CV: regular rate and rhythm.   Abdomen: soft, Tenderness to palpation and percussion all quadrants No CVAT. Hyperactive bowel sounds.  Ext: no edema, color normal, vascularity normal, temperature normal  MS: Normal gait and station are There are no preventive care reminders to display for this patient.  Assessment and Plan:   The following treatment plan was discussed   1. Generalized abdominal pain  COMP METABOLIC PANEL    CBC WITH DIFFERENTIAL    URINALYSIS,CULTURE IF INDICATED    CT-ABDOMEN-PELVIS WITH    ciprofloxacin (CIPRO) 500 MG Tab    metronidazole (FLAGYL) 500 MG Tab    CANCELED: CT-ABDOMEN-PELVIS WITH   2. Diverticulitis of large intestine without perforation or abscess without bleeding     rule out diverticulitis    Followup: Return in about 2 weeks (around 1/2/2018), or if symptoms worsen or fail to improve.   Please note that this dictation was created using voice recognition software. I have made every reasonable attempt to correct obvious errors, but I expect that there are errors of grammar and possibly content that I did not discover before finalizing the note.

## 2017-12-19 NOTE — TELEPHONE ENCOUNTER
1. Caller Name: Mia/Imaging                                         Call Back Number: [id number]      Patient approves a detailed voicemail message: N\A    Imaging states CT of abdomen/pelvis did not meet criteria.  She states if changed to an ultrasound there would be no problem.  In order to do the CT a peer to peer needs done. [phone]  ID #[id number].  Pt is scheduled at 4 so she needs to know no later than 1.

## 2017-12-29 ENCOUNTER — OFFICE VISIT (OUTPATIENT)
Dept: MEDICAL GROUP | Facility: PHYSICIAN GROUP | Age: 62
End: 2017-12-29
Payer: COMMERCIAL

## 2017-12-29 ENCOUNTER — HOSPITAL ENCOUNTER (OUTPATIENT)
Facility: MEDICAL CENTER | Age: 62
End: 2017-12-29
Attending: NURSE PRACTITIONER
Payer: COMMERCIAL

## 2017-12-29 VITALS
BODY MASS INDEX: 31.82 KG/M2 | OXYGEN SATURATION: 98 % | WEIGHT: 191 LBS | SYSTOLIC BLOOD PRESSURE: 98 MMHG | DIASTOLIC BLOOD PRESSURE: 64 MMHG | HEIGHT: 65 IN | TEMPERATURE: 97.5 F | RESPIRATION RATE: 16 BRPM | HEART RATE: 74 BPM

## 2017-12-29 DIAGNOSIS — K64.9 HEMORRHOIDS, UNSPECIFIED HEMORRHOID TYPE: ICD-10-CM

## 2017-12-29 DIAGNOSIS — R21 RASH: ICD-10-CM

## 2017-12-29 DIAGNOSIS — R19.7 DIARRHEA, UNSPECIFIED TYPE: ICD-10-CM

## 2017-12-29 DIAGNOSIS — K57.32 DIVERTICULITIS OF LARGE INTESTINE WITHOUT PERFORATION OR ABSCESS WITHOUT BLEEDING: ICD-10-CM

## 2017-12-29 PROCEDURE — 87045 FECES CULTURE AEROBIC BACT: CPT

## 2017-12-29 PROCEDURE — 87046 STOOL CULTR AEROBIC BACT EA: CPT

## 2017-12-29 PROCEDURE — 87329 GIARDIA AG IA: CPT

## 2017-12-29 PROCEDURE — 99214 OFFICE O/P EST MOD 30 MIN: CPT | Performed by: NURSE PRACTITIONER

## 2017-12-29 PROCEDURE — 87328 CRYPTOSPORIDIUM AG IA: CPT

## 2017-12-29 RX ORDER — HYDROXYZINE HYDROCHLORIDE 25 MG/1
25 TABLET, FILM COATED ORAL 3 TIMES DAILY PRN
Qty: 30 TAB | Refills: 0 | Status: SHIPPED | OUTPATIENT
Start: 2017-12-29 | End: 2018-12-06

## 2017-12-29 NOTE — PROGRESS NOTES
Subjective:     Chief Complaint   Patient presents with   • Follow-Up     abdominal pn       HPI:  Acacia Arboleda is a 62 y.o. female here today to discuss the following:    Rash  Patient reports itching rash for 3 days following illness. Rash started on trunk and spread to extremities. She has been having clear liquid diet due to recent diverticulitis. She is unable to identify any triggers for the rash. She denies any fever, chills, sore throat or arthralgias. She has had abdominal pain, nausea and explosive diarrhea.    Diarrhea  Patient was recently treated for diverticulitis. She returns today and abdominal pain has decreased however she's had some episodes of explosive diarrhea. She denies any fever or chills       Current medicines (including changes today)  Current Outpatient Prescriptions   Medication Sig Dispense Refill   • Melatonin 5 MG Cap Take  by mouth.     • hydrOXYzine HCl (ATARAX) 25 MG Tab Take 1 Tab by mouth 3 times a day as needed for Itching. 30 Tab 0   • levothyroxine (SYNTHROID) 175 MCG Tab Take 1 tablet daily 90 Tab 3   • loratadine (CLARITIN) 10 MG TABS Take 10 mg by mouth every day.     • acyclovir (ZOVIRAX) 400 MG tablet Take 1 Tab by mouth 3 times a day. 21 Tab 0   • MAGNESIUM PO Take  by mouth.     • Multiple Vitamins-Minerals (WOMENS 50+ MULTI VITAMIN/MIN PO) Take  by mouth.     • ascorbic acid (ASCORBIC ACID) 500 MG TABS Take 500 mg by mouth every day.     • VITAMIN E by Does not apply route.     • docosahexanoic acid (OMEGA 3 FA) 1000 MG CAPS Take 1,000 mg by mouth 3 times a day, with meals.     • Calcium Carbonate-Vitamin D (CALCIUM + D PO) Take  by mouth.     • Multiple Vitamins-Minerals (OCUVITE) TABS Take 1 Tab by mouth every day.     • Coenzyme Q10 (CO Q 10) 10 MG CAPS Take  by mouth.     • Cyanocobalamin (VITAMIN B12 PO) Take  by mouth.     • Naproxen Sodium (ALEVE PO) Take  by mouth.     • mometasone (NASONEX) 50 MCG/ACT nasal spray Spray 2 Sprays in nose every day.  "Each nostril 1 Inhaler 2   • NON SPECIFIED DIABETIC TEST STRIP (ONE TOUCH ULTRA) TEST 2-3 TIMES DAILY        No current facility-administered medications for this visit.        She  has a past medical history of Allergic rhinitis; Arthritis; Asthma; CHRONIC SINUSITIS (9/11/2009); Diabetes (CMS-[facility]); Dyslipidemia (9/11/2009); GERD (gastroesophageal reflux disease) (9/11/2009); Hemorrhoids; Herpes (9/11/2009); Hypothyroid (9/11/2009); IBS (irritable bowel syndrome) (9/11/2009); and Snoring (11/19/2013).    ROS   Review of Systems   Constitutional: Negative for fever, chills, weight loss and malaise/fatigue.   HENT: Negative for ear pain, nosebleeds, congestion, sore throat and neck pain.    Respiratory: Negative for cough, sputum production, shortness of breath and wheezing.    Cardiovascular: Negative for chest pain, palpitations,  and leg swelling.   Gastrointestinal:positive for abdominal pain, nausea and diarrhea  Neurological: Negative for dizziness, tingling, tremors, sensory change, focal weakness and headaches.   Skin: Positive for rash  All other systems reviewed and are negative except as in HPI.   Objective:   Physical Exam:  Blood pressure (!) 98/64, pulse 74, temperature 36.4 °C (97.5 °F), resp. rate 16, height 1.651 m (5' 5\"), weight 86.6 kg (191 lb), last menstrual period 03/01/1985, SpO2 98 %. Body mass index is 31.78 kg/m².   Physical Exam:  Alert, oriented in no acute distress.  Eye contact is good, speech goal directed, affect calm  HEENT: conjunctiva non-injected, sclera non-icteric.  Pinna normal.   Neck No adenopathy or masses in the neck or supraclavicular regions.  Lungs: clear to auscultation bilaterally with good excursion.  CV: regular rate and rhythm.   Abdomen: soft, bilateral lower quadrant tenderness, No CVAT.hyperactive bowel sounds.  Ext: no edema, color normal, vascularity normal, temperature normal  MS: Normal gait and station  Skin: fine macular rash, pale pink, tan in color. No " pustules or defined lesions    There are no preventive care reminders to display for this patient.  Assessment and Plan:   The following treatment plan was discussed   1. Rash  hydrOXYzine HCl (ATARAX) 25 MG Tab   2. Diverticulitis of large intestine without perforation or abscess without bleeding  REFERRAL TO GASTROENTEROLOGY   3. Hemorrhoids, unspecified hemorrhoid type     4. Diarrhea, unspecified type  COMPLETE O&P    CULTURE STOOL    STOOL WBC'S    CDIFF BY PCR       Followup: Return if symptoms worsen or fail to improve.   Please note that this dictation was created using voice recognition software. I have made every reasonable attempt to correct obvious errors, but I expect that there are errors of grammar and possibly content that I did not discover before finalizing the note.

## 2017-12-29 NOTE — ASSESSMENT & PLAN NOTE
Patient reports itching rash for 3 days following illness. Rash started on trunk and spread to extremities. She has been having clear liquid diet due to recent diverticulitis. She is unable to identify any triggers for the rash. She denies any fever, chills, sore throat or arthralgias. She has had abdominal pain, nausea and explosive diarrhea.

## 2017-12-30 ENCOUNTER — HOSPITAL ENCOUNTER (OUTPATIENT)
Facility: MEDICAL CENTER | Age: 62
End: 2017-12-30
Attending: NURSE PRACTITIONER
Payer: COMMERCIAL

## 2017-12-30 DIAGNOSIS — R19.7 DIARRHEA, UNSPECIFIED TYPE: ICD-10-CM

## 2017-12-30 LAB
C DIFF DNA SPEC QL NAA+PROBE: NEGATIVE
C DIFF TOX GENS STL QL NAA+PROBE: NEGATIVE
G LAMBLIA+C PARVUM AG STL QL RAPID: NORMAL
SIGNIFICANT IND 70042: NORMAL
SITE SITE: NORMAL
SOURCE SOURCE: NORMAL
STOOL OTHER ELEMENTS 1951: NORMAL
WBC STL QL MICRO: NORMAL

## 2017-12-30 PROCEDURE — 89055 LEUKOCYTE ASSESSMENT FECAL: CPT

## 2017-12-30 PROCEDURE — 87493 C DIFF AMPLIFIED PROBE: CPT

## 2018-01-01 LAB
BACTERIA STL CULT: NORMAL
SIGNIFICANT IND 70042: NORMAL
SITE SITE: NORMAL
SOURCE SOURCE: NORMAL

## 2018-01-02 NOTE — ASSESSMENT & PLAN NOTE
Patient was recently treated for diverticulitis. She returns today and abdominal pain has decreased however she's had some episodes of explosive diarrhea. She denies any fever or chills

## 2018-02-23 ENCOUNTER — SLEEP CENTER VISIT (OUTPATIENT)
Dept: SLEEP MEDICINE | Facility: MEDICAL CENTER | Age: 63
End: 2018-02-23
Payer: COMMERCIAL

## 2018-02-23 VITALS
OXYGEN SATURATION: 92 % | SYSTOLIC BLOOD PRESSURE: 106 MMHG | WEIGHT: 183 LBS | BODY MASS INDEX: 30.49 KG/M2 | HEIGHT: 65 IN | HEART RATE: 66 BPM | RESPIRATION RATE: 16 BRPM | DIASTOLIC BLOOD PRESSURE: 64 MMHG

## 2018-02-23 DIAGNOSIS — G47.33 OSA TREATED WITH BIPAP: ICD-10-CM

## 2018-02-23 DIAGNOSIS — E66.9 OBESITY (BMI 30-39.9): ICD-10-CM

## 2018-02-23 DIAGNOSIS — F51.04 PSYCHOPHYSIOLOGICAL INSOMNIA: ICD-10-CM

## 2018-02-23 PROCEDURE — 99213 OFFICE O/P EST LOW 20 MIN: CPT | Performed by: INTERNAL MEDICINE

## 2018-02-23 NOTE — PROGRESS NOTES
Chief Complaint   Patient presents with   • Follow-Up     JAYSHREE     HPI: This patient is a 62 y.o. Female returns for follow-up of severe JAYSHREE and insomnia. Her polysomnogram showed AHI of 85 events per hour; she has been compliant with BiPAP 16/14 cm of water, with normal AHI of 2.7 per compliance card. She has 97% BiPAP usage for 6 hours nightly. She has seen Dr. Vail for cognitive behavioral therapy and used sleep restriction therapy for insomnia. She weaned off Lunesta and uses melatonin qhs. She denies daytime hypersomnolence. She is using nasal pillows, denies difficulty with mask fit or leakage.   She has tried Ambien and Belsomra in the past without subjective benefit.      Past Medical History:   Diagnosis Date   • Allergic rhinitis    • Arthritis    • Asthma    • CHRONIC SINUSITIS 9/11/2009   • Diabetes (CMS-[facility])    • Dyslipidemia 9/11/2009   • GERD (gastroesophageal reflux disease) 9/11/2009   • Hemorrhoids    • Herpes 9/11/2009   • Hypothyroid 9/11/2009   • IBS (irritable bowel syndrome) 9/11/2009   • Snoring 11/19/2013       Social History     Social History   • Marital status:      Spouse name: N/A   • Number of children: N/A   • Years of education: N/A     Occupational History   • Not on file.     Social History Main Topics   • Smoking status: Former Smoker     Packs/day: 3.00     Types: Cigarettes   • Smokeless tobacco: Never Used      Comment: QUIT 1986   • Alcohol use 0.0 oz/week      Comment: RARE   • Drug use: No   • Sexual activity: Not Currently     Partners: Male     Other Topics Concern   • Not on file     Social History Narrative   • No narrative on file       Family History   Problem Relation Age of Onset   • Heart Disease Father      AAA   • Cancer Mother      UTERINE   • Heart Disease Mother      AFIB   • Cancer Maternal Aunt      BREAST   • Cancer Maternal Uncle      COLON       Current Outpatient Prescriptions on File Prior to Visit   Medication Sig Dispense Refill   • Melatonin 5  MG Cap Take  by mouth.     • levothyroxine (SYNTHROID) 175 MCG Tab Take 1 tablet daily 90 Tab 3   • loratadine (CLARITIN) 10 MG TABS Take 10 mg by mouth every day.     • mometasone (NASONEX) 50 MCG/ACT nasal spray Spray 2 Sprays in nose every day. Each nostril 1 Inhaler 2   • hydrOXYzine HCl (ATARAX) 25 MG Tab Take 1 Tab by mouth 3 times a day as needed for Itching. 30 Tab 0   • acyclovir (ZOVIRAX) 400 MG tablet Take 1 Tab by mouth 3 times a day. 21 Tab 0   • MAGNESIUM PO Take  by mouth.     • Multiple Vitamins-Minerals (WOMENS 50+ MULTI VITAMIN/MIN PO) Take  by mouth.     • ascorbic acid (ASCORBIC ACID) 500 MG TABS Take 500 mg by mouth every day.     • VITAMIN E by Does not apply route.     • docosahexanoic acid (OMEGA 3 FA) 1000 MG CAPS Take 1,000 mg by mouth 3 times a day, with meals.     • Multiple Vitamins-Minerals (OCUVITE) TABS Take 1 Tab by mouth every day.     • Coenzyme Q10 (CO Q 10) 10 MG CAPS Take  by mouth.     • Cyanocobalamin (VITAMIN B12 PO) Take  by mouth.     • Naproxen Sodium (ALEVE PO) Take  by mouth.     • NON SPECIFIED DIABETIC TEST STRIP (ONE TOUCH ULTRA) TEST 2-3 TIMES DAILY        No current facility-administered medications on file prior to visit.        Allergies: Sulfa drugs    ROS:   Constitutional: Denies fevers, chills, night sweats, fatigue or weight loss  Eyes: Denies vision loss, pain, drainage, double vision  Ears, Nose, Throat: Denies earache, difficulty hearing, tinnitus, nasal congestion, hoarseness  Cardiovascular: Denies chest pain, tightness, palpitations, orthopnea or edema  Respiratory: Denies shortness of breath, cough, wheezing, hemoptysis  Sleep: Denies daytime sleepiness, snoring, apneas, insomnia, morning headaches  GI: Denies heartburn, dysphagia, nausea, abdominal pain, +diarrhea or constipation  : Denies frequent urination, hematuria, discharge or painful urination  Musculoskeletal: Denies back pain, painful joints, sore muscles  Neurological: Denies weakness or  "headaches  Skin: No rashes    Blood pressure 106/64, pulse 66, resp. rate 16, height 1.651 m (5' 5\"), weight 87.5 kg (193 lb), last menstrual period 03/01/1985, SpO2 92 %.    Physical Exam:  Appearance: Well-nourished, well-developed, in no acute distress  HEENT: Normocephalic, atraumatic, white sclera, PERRLA, oropharynx clear  Neck: No adenopathy or masses  Respiratory: no intercostal retractions or accessory muscle use  Lungs auscultation: Clear to auscultation bilaterally  Cardiovascular: Regular rate rhythm. No murmurs, rubs or gallops.  No LE edema  Abdomen: soft, nondistended  Gait: Normal  Digits: No clubbing, cyanosis  Motor: No focal deficits  Orientation: Oriented to time, person and place    Diagnosis:  1. JAYSHREE treated with BiPAP     2. Psychophysiological insomnia     3. Obesity (BMI 30-39.9)         Plan:  The patient shows excellent compliance and response to BiPAP therapy. She is benefiting from therapy. Continue BiPAP: 16/14 cm of water.   She is weaned off Lunesta; continue melatonin qhs prn.  Dietary and exercise modification encouraged for weight loss.  Return in about 6 months (around 8/23/2018).      "

## 2018-02-26 ENCOUNTER — HOSPITAL ENCOUNTER (OUTPATIENT)
Dept: LAB | Facility: MEDICAL CENTER | Age: 63
End: 2018-02-26
Attending: PHYSICIAN ASSISTANT
Payer: COMMERCIAL

## 2018-02-26 LAB
ANION GAP SERPL CALC-SCNC: 9 MMOL/L (ref 0–11.9)
BUN SERPL-MCNC: 16 MG/DL (ref 8–22)
CALCIUM SERPL-MCNC: 9.6 MG/DL (ref 8.5–10.5)
CHLORIDE SERPL-SCNC: 104 MMOL/L (ref 96–112)
CO2 SERPL-SCNC: 29 MMOL/L (ref 20–33)
CREAT SERPL-MCNC: 0.64 MG/DL (ref 0.5–1.4)
GLUCOSE SERPL-MCNC: 85 MG/DL (ref 65–99)
POTASSIUM SERPL-SCNC: 4.9 MMOL/L (ref 3.6–5.5)
SODIUM SERPL-SCNC: 142 MMOL/L (ref 135–145)

## 2018-02-26 PROCEDURE — 36415 COLL VENOUS BLD VENIPUNCTURE: CPT

## 2018-02-26 PROCEDURE — 80048 BASIC METABOLIC PNL TOTAL CA: CPT

## 2018-03-01 ENCOUNTER — HOSPITAL ENCOUNTER (OUTPATIENT)
Dept: RADIOLOGY | Facility: MEDICAL CENTER | Age: 63
End: 2018-03-01
Attending: PHYSICIAN ASSISTANT
Payer: COMMERCIAL

## 2018-03-01 DIAGNOSIS — R10.32 ABDOMINAL PAIN, LEFT LOWER QUADRANT: ICD-10-CM

## 2018-03-01 DIAGNOSIS — R93.2 NONVISUALIZATION OF GALLBLADDER: ICD-10-CM

## 2018-03-01 PROCEDURE — 700117 HCHG RX CONTRAST REV CODE 255: Performed by: PHYSICIAN ASSISTANT

## 2018-03-01 PROCEDURE — 74177 CT ABD & PELVIS W/CONTRAST: CPT

## 2018-03-01 RX ADMIN — IOHEXOL 100 ML: 350 INJECTION, SOLUTION INTRAVENOUS at 14:45

## 2018-03-01 RX ADMIN — IOHEXOL 50 ML: 240 INJECTION, SOLUTION INTRATHECAL; INTRAVASCULAR; INTRAVENOUS; ORAL at 14:45

## 2018-03-28 RX ORDER — LEVOTHYROXINE SODIUM 175 UG/1
TABLET ORAL
Qty: 90 TAB | Refills: 1 | Status: SHIPPED | OUTPATIENT
Start: 2018-03-28 | End: 2018-06-21 | Stop reason: SDUPTHER

## 2018-05-11 ENCOUNTER — HOSPITAL ENCOUNTER (OUTPATIENT)
Dept: LAB | Facility: MEDICAL CENTER | Age: 63
End: 2018-05-11
Attending: PHYSICIAN ASSISTANT
Payer: COMMERCIAL

## 2018-05-11 LAB
ANION GAP SERPL CALC-SCNC: 10 MMOL/L (ref 0–11.9)
BASOPHILS # BLD AUTO: 1.2 % (ref 0–1.8)
BASOPHILS # BLD: 0.06 K/UL (ref 0–0.12)
BUN SERPL-MCNC: 17 MG/DL (ref 8–22)
CALCIUM SERPL-MCNC: 9.9 MG/DL (ref 8.5–10.5)
CHLORIDE SERPL-SCNC: 104 MMOL/L (ref 96–112)
CO2 SERPL-SCNC: 28 MMOL/L (ref 20–33)
CREAT SERPL-MCNC: 0.67 MG/DL (ref 0.5–1.4)
EOSINOPHIL # BLD AUTO: 0.06 K/UL (ref 0–0.51)
EOSINOPHIL NFR BLD: 1.2 % (ref 0–6.9)
ERYTHROCYTE [DISTWIDTH] IN BLOOD BY AUTOMATED COUNT: 42.4 FL (ref 35.9–50)
GLUCOSE SERPL-MCNC: 77 MG/DL (ref 65–99)
HCT VFR BLD AUTO: 47.6 % (ref 37–47)
HGB BLD-MCNC: 16 G/DL (ref 12–16)
IMM GRANULOCYTES # BLD AUTO: 0.01 K/UL (ref 0–0.11)
IMM GRANULOCYTES NFR BLD AUTO: 0.2 % (ref 0–0.9)
LYMPHOCYTES # BLD AUTO: 2.29 K/UL (ref 1–4.8)
LYMPHOCYTES NFR BLD: 45 % (ref 22–41)
MCH RBC QN AUTO: 29.2 PG (ref 27–33)
MCHC RBC AUTO-ENTMCNC: 33.6 G/DL (ref 33.6–35)
MCV RBC AUTO: 86.9 FL (ref 81.4–97.8)
MONOCYTES # BLD AUTO: 0.39 K/UL (ref 0–0.85)
MONOCYTES NFR BLD AUTO: 7.7 % (ref 0–13.4)
NEUTROPHILS # BLD AUTO: 2.28 K/UL (ref 2–7.15)
NEUTROPHILS NFR BLD: 44.7 % (ref 44–72)
NRBC # BLD AUTO: 0 K/UL
NRBC BLD-RTO: 0 /100 WBC
PLATELET # BLD AUTO: 191 K/UL (ref 164–446)
PMV BLD AUTO: 12.6 FL (ref 9–12.9)
POTASSIUM SERPL-SCNC: 4.2 MMOL/L (ref 3.6–5.5)
RBC # BLD AUTO: 5.48 M/UL (ref 4.2–5.4)
SODIUM SERPL-SCNC: 142 MMOL/L (ref 135–145)
WBC # BLD AUTO: 5.1 K/UL (ref 4.8–10.8)

## 2018-05-11 PROCEDURE — 85025 COMPLETE CBC W/AUTO DIFF WBC: CPT

## 2018-05-11 PROCEDURE — 36415 COLL VENOUS BLD VENIPUNCTURE: CPT

## 2018-05-11 PROCEDURE — 80048 BASIC METABOLIC PNL TOTAL CA: CPT

## 2018-05-30 ENCOUNTER — HOSPITAL ENCOUNTER (OUTPATIENT)
Dept: RADIOLOGY | Facility: MEDICAL CENTER | Age: 63
End: 2018-05-30
Attending: PHYSICIAN ASSISTANT
Payer: COMMERCIAL

## 2018-05-30 DIAGNOSIS — R93.89 ABNORMAL RADIOGRAPHIC EXAMINATION: ICD-10-CM

## 2018-05-30 PROCEDURE — 74177 CT ABD & PELVIS W/CONTRAST: CPT

## 2018-05-30 PROCEDURE — 700117 HCHG RX CONTRAST REV CODE 255: Performed by: PHYSICIAN ASSISTANT

## 2018-05-30 RX ADMIN — IOHEXOL 50 ML: 240 INJECTION, SOLUTION INTRATHECAL; INTRAVASCULAR; INTRAVENOUS; ORAL at 11:07

## 2018-05-30 RX ADMIN — IOHEXOL 100 ML: 350 INJECTION, SOLUTION INTRAVENOUS at 11:07

## 2018-06-21 ENCOUNTER — OFFICE VISIT (OUTPATIENT)
Dept: MEDICAL GROUP | Facility: PHYSICIAN GROUP | Age: 63
End: 2018-06-21
Payer: COMMERCIAL

## 2018-06-21 VITALS
RESPIRATION RATE: 16 BRPM | HEIGHT: 65 IN | WEIGHT: 177 LBS | SYSTOLIC BLOOD PRESSURE: 120 MMHG | TEMPERATURE: 97.6 F | DIASTOLIC BLOOD PRESSURE: 70 MMHG | HEART RATE: 66 BPM | OXYGEN SATURATION: 96 % | BODY MASS INDEX: 29.49 KG/M2

## 2018-06-21 DIAGNOSIS — K57.32 DIVERTICULITIS OF LARGE INTESTINE WITHOUT PERFORATION OR ABSCESS WITHOUT BLEEDING: ICD-10-CM

## 2018-06-21 DIAGNOSIS — E03.9 HYPOTHYROIDISM, UNSPECIFIED TYPE: ICD-10-CM

## 2018-06-21 DIAGNOSIS — Z76.89 ENCOUNTER TO ESTABLISH CARE WITH NEW DOCTOR: ICD-10-CM

## 2018-06-21 DIAGNOSIS — N81.10 VAGINAL PROLAPSE: ICD-10-CM

## 2018-06-21 PROBLEM — R10.84 GENERALIZED ABDOMINAL PAIN: Status: RESOLVED | Noted: 2017-12-19 | Resolved: 2018-06-21

## 2018-06-21 PROCEDURE — 99214 OFFICE O/P EST MOD 30 MIN: CPT | Performed by: NURSE PRACTITIONER

## 2018-06-21 RX ORDER — LEVOTHYROXINE SODIUM 175 UG/1
TABLET ORAL
Qty: 90 TAB | Refills: 3 | Status: SHIPPED | OUTPATIENT
Start: 2018-06-21 | End: 2019-08-06 | Stop reason: SDUPTHER

## 2018-06-21 NOTE — ASSESSMENT & PLAN NOTE
Recent CT shows that there has been significant improvement since her hospitalization.  She has an appointment with her GI today.

## 2018-06-25 PROBLEM — R21 RASH: Status: RESOLVED | Noted: 2017-12-29 | Resolved: 2018-06-25

## 2018-06-25 PROBLEM — N81.10 VAGINAL PROLAPSE: Status: ACTIVE | Noted: 2018-06-25

## 2018-08-29 ENCOUNTER — APPOINTMENT (RX ONLY)
Dept: URBAN - METROPOLITAN AREA CLINIC 4 | Facility: CLINIC | Age: 63
Setting detail: DERMATOLOGY
End: 2018-08-29

## 2018-08-29 ENCOUNTER — SLEEP CENTER VISIT (OUTPATIENT)
Dept: SLEEP MEDICINE | Facility: MEDICAL CENTER | Age: 63
End: 2018-08-29
Payer: COMMERCIAL

## 2018-08-29 VITALS
RESPIRATION RATE: 16 BRPM | DIASTOLIC BLOOD PRESSURE: 68 MMHG | BODY MASS INDEX: 29.16 KG/M2 | HEART RATE: 66 BPM | OXYGEN SATURATION: 94 % | HEIGHT: 65 IN | SYSTOLIC BLOOD PRESSURE: 108 MMHG | WEIGHT: 175 LBS

## 2018-08-29 DIAGNOSIS — F51.04 PSYCHOPHYSIOLOGICAL INSOMNIA: ICD-10-CM

## 2018-08-29 DIAGNOSIS — G47.33 OSA TREATED WITH BIPAP: ICD-10-CM

## 2018-08-29 DIAGNOSIS — L82.1 OTHER SEBORRHEIC KERATOSIS: ICD-10-CM

## 2018-08-29 DIAGNOSIS — D18.0 HEMANGIOMA: ICD-10-CM

## 2018-08-29 DIAGNOSIS — D22 MELANOCYTIC NEVI: ICD-10-CM

## 2018-08-29 DIAGNOSIS — L81.4 OTHER MELANIN HYPERPIGMENTATION: ICD-10-CM

## 2018-08-29 PROBLEM — E03.9 HYPOTHYROIDISM, UNSPECIFIED: Status: ACTIVE | Noted: 2018-08-29

## 2018-08-29 PROBLEM — D22.61 MELANOCYTIC NEVI OF RIGHT UPPER LIMB, INCLUDING SHOULDER: Status: ACTIVE | Noted: 2018-08-29

## 2018-08-29 PROBLEM — D22.72 MELANOCYTIC NEVI OF LEFT LOWER LIMB, INCLUDING HIP: Status: ACTIVE | Noted: 2018-08-29

## 2018-08-29 PROBLEM — D22.62 MELANOCYTIC NEVI OF LEFT UPPER LIMB, INCLUDING SHOULDER: Status: ACTIVE | Noted: 2018-08-29

## 2018-08-29 PROBLEM — D22.5 MELANOCYTIC NEVI OF TRUNK: Status: ACTIVE | Noted: 2018-08-29

## 2018-08-29 PROBLEM — D18.01 HEMANGIOMA OF SKIN AND SUBCUTANEOUS TISSUE: Status: ACTIVE | Noted: 2018-08-29

## 2018-08-29 PROBLEM — E78.5 HYPERLIPIDEMIA, UNSPECIFIED: Status: ACTIVE | Noted: 2018-08-29

## 2018-08-29 PROBLEM — D22.71 MELANOCYTIC NEVI OF RIGHT LOWER LIMB, INCLUDING HIP: Status: ACTIVE | Noted: 2018-08-29

## 2018-08-29 PROCEDURE — 99213 OFFICE O/P EST LOW 20 MIN: CPT

## 2018-08-29 PROCEDURE — ? SUNSCREEN RECOMMENDATIONS

## 2018-08-29 PROCEDURE — 99213 OFFICE O/P EST LOW 20 MIN: CPT | Performed by: INTERNAL MEDICINE

## 2018-08-29 PROCEDURE — ? COUNSELING

## 2018-08-29 ASSESSMENT — LOCATION SIMPLE DESCRIPTION DERM
LOCATION SIMPLE: LEFT THIGH
LOCATION SIMPLE: RIGHT HAND
LOCATION SIMPLE: RIGHT LOWER BACK
LOCATION SIMPLE: LEFT UPPER BACK
LOCATION SIMPLE: RIGHT FOREARM
LOCATION SIMPLE: RIGHT POSTERIOR UPPER ARM
LOCATION SIMPLE: RIGHT THIGH
LOCATION SIMPLE: ABDOMEN
LOCATION SIMPLE: LEFT CHEEK
LOCATION SIMPLE: LEFT ANTERIOR NECK
LOCATION SIMPLE: LEFT HAND
LOCATION SIMPLE: LEFT FOREARM
LOCATION SIMPLE: UPPER BACK
LOCATION SIMPLE: LEFT POSTERIOR UPPER ARM
LOCATION SIMPLE: RIGHT CHEEK

## 2018-08-29 ASSESSMENT — LOCATION DETAILED DESCRIPTION DERM
LOCATION DETAILED: SUPERIOR THORACIC SPINE
LOCATION DETAILED: RIGHT ANTERIOR PROXIMAL THIGH
LOCATION DETAILED: LEFT INFERIOR ANTERIOR NECK
LOCATION DETAILED: RIGHT DISTAL POSTERIOR UPPER ARM
LOCATION DETAILED: LEFT CENTRAL MALAR CHEEK
LOCATION DETAILED: LEFT ANTERIOR PROXIMAL THIGH
LOCATION DETAILED: PERIUMBILICAL SKIN
LOCATION DETAILED: LEFT PROXIMAL POSTERIOR UPPER ARM
LOCATION DETAILED: RIGHT RADIAL DORSAL HAND
LOCATION DETAILED: LEFT PROXIMAL DORSAL FOREARM
LOCATION DETAILED: RIGHT SUPERIOR MEDIAL MIDBACK
LOCATION DETAILED: LEFT SUPERIOR MEDIAL UPPER BACK
LOCATION DETAILED: RIGHT CENTRAL MALAR CHEEK
LOCATION DETAILED: RIGHT RIB CAGE
LOCATION DETAILED: LEFT ULNAR DORSAL HAND
LOCATION DETAILED: RIGHT PROXIMAL DORSAL FOREARM

## 2018-08-29 ASSESSMENT — LOCATION ZONE DERM
LOCATION ZONE: ARM
LOCATION ZONE: NECK
LOCATION ZONE: LEG
LOCATION ZONE: FACE
LOCATION ZONE: HAND
LOCATION ZONE: TRUNK

## 2018-08-29 NOTE — PROGRESS NOTES
Chief Complaint   Patient presents with   • Follow-Up     JAYSHREE, 6 month follow up     HPI: This patient is a 63 y.o. Female returns for follow-up of severe JAYSHREE and insomnia. Her polysomnogram showed AHI of 85 events per hour; she has been 100% compliant with BiPAP: 16/14 cm of water per compliance card, for 6.5 hours nightly, with normal AHI of 2.8.   She uses nasal pillows and a chinstrap, both which are outdated and require replacement.  Requires a new prescription to her DME for CPAP supplies.  She has seen Dr. Vail for cognitive behavioral therapy and used sleep restriction therapy for insomnia. She weaned off Lunesta and uses melatonin qhs.  Her insomnia had worsened recently with a death in the family.  She denies daytime hypersomnolence.  Weight is down, with BMI: 29.  She has tried Ambien and Belsomra in the past without subjective benefit      Past Medical History:   Diagnosis Date   • Allergic rhinitis    • Arthritis    • Asthma    • CHRONIC SINUSITIS 9/11/2009   • Diabetes (HCC)    • Dyslipidemia 9/11/2009   • GERD (gastroesophageal reflux disease) 9/11/2009   • Hemorrhoids    • Herpes 9/11/2009   • Hypothyroid 9/11/2009   • IBS (irritable bowel syndrome) 9/11/2009   • Snoring 11/19/2013       Social History     Social History   • Marital status:      Spouse name: N/A   • Number of children: N/A   • Years of education: N/A     Occupational History   • Not on file.     Social History Main Topics   • Smoking status: Former Smoker     Packs/day: 3.00     Types: Cigarettes   • Smokeless tobacco: Never Used      Comment: QUIT 1986   • Alcohol use No   • Drug use: No   • Sexual activity: No     Other Topics Concern   • Not on file     Social History Narrative   • No narrative on file       Family History   Problem Relation Age of Onset   • Heart Disease Father         AAA   • Cancer Mother         UTERINE   • Heart Disease Mother         AFIB   • Cancer Maternal Aunt         BREAST   • Cancer Maternal  "Uncle         COLON       Current Outpatient Prescriptions on File Prior to Visit   Medication Sig Dispense Refill   • levothyroxine (SYNTHROID) 175 MCG Tab TAKE 1 TABLET BY MOUTH  DAILY 90 Tab 3   • Propylene Glycol (SYSTANE BALANCE) 0.6 % Solution by Ophthalmic route.     • Melatonin 5 MG Cap Take  by mouth.     • loratadine (CLARITIN) 10 MG TABS Take 10 mg by mouth every day.     • MAGNESIUM PO Take  by mouth.     • Multiple Vitamins-Minerals (WOMENS 50+ MULTI VITAMIN/MIN PO) Take  by mouth.     • VITAMIN E by Does not apply route.     • Multiple Vitamins-Minerals (OCUVITE) TABS Take 1 Tab by mouth every day.     • Naproxen Sodium (ALEVE PO) Take  by mouth.     • mometasone (NASONEX) 50 MCG/ACT nasal spray Spray 2 Sprays in nose every day. Each nostril 1 Inhaler 2   • NON SPECIFIED DIABETIC TEST STRIP (ONE TOUCH ULTRA) TEST 2-3 TIMES DAILY      • hydrOXYzine HCl (ATARAX) 25 MG Tab Take 1 Tab by mouth 3 times a day as needed for Itching. 30 Tab 0   • acyclovir (ZOVIRAX) 400 MG tablet Take 1 Tab by mouth 3 times a day. 21 Tab 0     No current facility-administered medications on file prior to visit.        Allergies: Sulfa drugs    ROS:   Constitutional: Denies fevers, chills, night sweats, fatigue or weight loss  Eyes: Denies vision loss, pain, drainage, double vision  Ears, Nose, Throat: Denies earache, difficulty hearing, tinnitus, nasal congestion, hoarseness  Cardiovascular: Denies chest pain, tightness, palpitations, orthopnea or edema  Respiratory: Denies shortness of breath, cough, wheezing, hemoptysis  Sleep: As in HPI  GI: Denies heartburn, dysphagia, nausea, abdominal pain, diarrhea or constipation  : Denies frequent urination, hematuria, discharge or painful urination  Musculoskeletal: Denies back pain, painful joints, sore muscles  Neurological: Denies weakness or headaches  Skin: No rashes    Blood pressure 108/68, pulse 66, resp. rate 16, height 1.651 m (5' 5\"), weight 79.4 kg (175 lb), last " menstrual period 03/01/1985, SpO2 94 %.    Physical Exam:  Appearance: Well-nourished, well-developed, in no acute distress  HEENT: Normocephalic, atraumatic, white sclera, PERRLA, oropharynx clear  Neck: No adenopathy or masses  Respiratory: no intercostal retractions or accessory muscle use  Lungs auscultation: Clear to auscultation bilaterally  Cardiovascular: Regular rate rhythm. No murmurs, rubs or gallops.  No LE edema  Abdomen: soft, nondistended  Gait: Normal  Digits: No clubbing, cyanosis  Motor: No focal deficits  Orientation: Oriented to time, person and place    Diagnosis:  1. JAYSHREE treated with BiPAP  DME MASK AND SUPPLIES   2. Psychophysiological insomnia     3. BMI 29.0-29.9,adult         Plan:  The patient shows excellent compliance to BiPAP and is benefiting from treatment.  She requires replacement CPAP mask and chinstrap.  Prescription was submitted to [facility], for CPAP supplies.   Continue BiPAP: 16/14 cm of water nightly.  Encourage cognitive behavioral therapy for insomnia which has exacerbated with recent death in the family.  She has successfully been losing weight and was encouraged to continue with her dietary modification.  Return in about 6 months (around 2/28/2019).

## 2018-10-08 ENCOUNTER — TELEPHONE (OUTPATIENT)
Dept: MEDICAL GROUP | Facility: PHYSICIAN GROUP | Age: 63
End: 2018-10-08

## 2018-10-08 DIAGNOSIS — N81.10 VAGINAL PROLAPSE: ICD-10-CM

## 2018-10-08 NOTE — TELEPHONE ENCOUNTER
----- Message from Acacia Arboleda sent at 10/8/2018  3:54 PM PDT -----  Regarding: Non-Urgent Medical Question  Contact: [phone]  Francisco Penn,  At my last appt. we discussed a referral to an gyn/urologist if I couldn't get in with Dr. Lafleur before she left [facility].  Due to family deaths and being [place] of [place], I was not able to get an appt. before her departure.  Will you please forward that referral request to the doctor you recommended as soon as possible?    Thank you,  Acacia Arboleda  [phone] cell   Transporter

## 2018-10-08 NOTE — TELEPHONE ENCOUNTER
**Last office mentioned the need to see gynecology.     SPECIFIC Action To Be Taken: Referral Needed     2. Diagnosis/Clinical Reason for Request Vaginal Prolapse     3. Has an appt been made? No    4. Specialty & Provider Name/Lab/Imaging Location: [facility]    5. Patient approves a detailed message N\A    6. Patient preferred communication method: MyChart Voicemail Letter    7. Patient informed they will receive a return phone call from Primary Care office ONLY if there are any questions before processing their request and to call back if they haven't received a call in 5 days.

## 2018-11-27 ENCOUNTER — HOSPITAL ENCOUNTER (OUTPATIENT)
Dept: RADIOLOGY | Facility: MEDICAL CENTER | Age: 63
End: 2018-11-27
Attending: NURSE PRACTITIONER
Payer: COMMERCIAL

## 2018-11-27 DIAGNOSIS — Z12.39 SCREENING BREAST EXAMINATION: ICD-10-CM

## 2018-11-27 PROCEDURE — 77067 SCR MAMMO BI INCL CAD: CPT

## 2018-12-06 ENCOUNTER — HOSPITAL ENCOUNTER (OUTPATIENT)
Dept: RADIOLOGY | Facility: MEDICAL CENTER | Age: 63
End: 2018-12-06
Attending: OBSTETRICS & GYNECOLOGY
Payer: COMMERCIAL

## 2018-12-06 DIAGNOSIS — Z01.812 PRE-OPERATIVE LABORATORY EXAMINATION: ICD-10-CM

## 2018-12-06 DIAGNOSIS — Z01.811 PRE-OPERATIVE RESPIRATORY EXAMINATION: ICD-10-CM

## 2018-12-06 DIAGNOSIS — Z01.810 PRE-OPERATIVE CARDIOVASCULAR EXAMINATION: ICD-10-CM

## 2018-12-06 LAB
ANION GAP SERPL CALC-SCNC: 9 MMOL/L (ref 0–11.9)
APPEARANCE UR: CLEAR
BASOPHILS # BLD AUTO: 1.1 % (ref 0–1.8)
BASOPHILS # BLD: 0.06 K/UL (ref 0–0.12)
BILIRUB UR QL STRIP.AUTO: NEGATIVE
BUN SERPL-MCNC: 17 MG/DL (ref 8–22)
CALCIUM SERPL-MCNC: 9.7 MG/DL (ref 8.5–10.5)
CHLORIDE SERPL-SCNC: 103 MMOL/L (ref 96–112)
CO2 SERPL-SCNC: 29 MMOL/L (ref 20–33)
COLOR UR: YELLOW
CREAT SERPL-MCNC: 0.63 MG/DL (ref 0.5–1.4)
EOSINOPHIL # BLD AUTO: 0.06 K/UL (ref 0–0.51)
EOSINOPHIL NFR BLD: 1.1 % (ref 0–6.9)
ERYTHROCYTE [DISTWIDTH] IN BLOOD BY AUTOMATED COUNT: 41.8 FL (ref 35.9–50)
GLUCOSE SERPL-MCNC: 89 MG/DL (ref 65–99)
GLUCOSE UR STRIP.AUTO-MCNC: NEGATIVE MG/DL
HCT VFR BLD AUTO: 48.1 % (ref 37–47)
HGB BLD-MCNC: 15.8 G/DL (ref 12–16)
IMM GRANULOCYTES # BLD AUTO: 0 K/UL (ref 0–0.11)
IMM GRANULOCYTES NFR BLD AUTO: 0 % (ref 0–0.9)
KETONES UR STRIP.AUTO-MCNC: NEGATIVE MG/DL
LEUKOCYTE ESTERASE UR QL STRIP.AUTO: NEGATIVE
LYMPHOCYTES # BLD AUTO: 2.14 K/UL (ref 1–4.8)
LYMPHOCYTES NFR BLD: 40.1 % (ref 22–41)
MCH RBC QN AUTO: 29.2 PG (ref 27–33)
MCHC RBC AUTO-ENTMCNC: 32.8 G/DL (ref 33.6–35)
MCV RBC AUTO: 88.9 FL (ref 81.4–97.8)
MICRO URNS: NORMAL
MONOCYTES # BLD AUTO: 0.35 K/UL (ref 0–0.85)
MONOCYTES NFR BLD AUTO: 6.6 % (ref 0–13.4)
NEUTROPHILS # BLD AUTO: 2.73 K/UL (ref 2–7.15)
NEUTROPHILS NFR BLD: 51.1 % (ref 44–72)
NITRITE UR QL STRIP.AUTO: NEGATIVE
NRBC # BLD AUTO: 0 K/UL
NRBC BLD-RTO: 0 /100 WBC
PH UR STRIP.AUTO: 6.5 [PH]
PLATELET # BLD AUTO: 218 K/UL (ref 164–446)
PMV BLD AUTO: 11.5 FL (ref 9–12.9)
POTASSIUM SERPL-SCNC: 4.1 MMOL/L (ref 3.6–5.5)
PROT UR QL STRIP: NEGATIVE MG/DL
RBC # BLD AUTO: 5.41 M/UL (ref 4.2–5.4)
RBC UR QL AUTO: NEGATIVE
SODIUM SERPL-SCNC: 141 MMOL/L (ref 135–145)
SP GR UR STRIP.AUTO: 1.01
UROBILINOGEN UR STRIP.AUTO-MCNC: 0.2 MG/DL
WBC # BLD AUTO: 5.3 K/UL (ref 4.8–10.8)

## 2018-12-06 PROCEDURE — 93005 ELECTROCARDIOGRAM TRACING: CPT

## 2018-12-06 PROCEDURE — 71045 X-RAY EXAM CHEST 1 VIEW: CPT

## 2018-12-06 PROCEDURE — 85025 COMPLETE CBC W/AUTO DIFF WBC: CPT

## 2018-12-06 PROCEDURE — 93010 ELECTROCARDIOGRAM REPORT: CPT | Performed by: INTERNAL MEDICINE

## 2018-12-06 PROCEDURE — 81003 URINALYSIS AUTO W/O SCOPE: CPT

## 2018-12-06 PROCEDURE — 36415 COLL VENOUS BLD VENIPUNCTURE: CPT

## 2018-12-06 PROCEDURE — 80048 BASIC METABOLIC PNL TOTAL CA: CPT

## 2018-12-07 LAB — EKG IMPRESSION: NORMAL

## 2018-12-12 ENCOUNTER — HOSPITAL ENCOUNTER (OUTPATIENT)
Facility: MEDICAL CENTER | Age: 63
End: 2018-12-12
Attending: OBSTETRICS & GYNECOLOGY | Admitting: OBSTETRICS & GYNECOLOGY
Payer: COMMERCIAL

## 2018-12-12 VITALS
HEIGHT: 65 IN | BODY MASS INDEX: 30.6 KG/M2 | SYSTOLIC BLOOD PRESSURE: 113 MMHG | TEMPERATURE: 97.6 F | WEIGHT: 183.64 LBS | RESPIRATION RATE: 20 BRPM | DIASTOLIC BLOOD PRESSURE: 63 MMHG | HEART RATE: 59 BPM | OXYGEN SATURATION: 99 %

## 2018-12-12 PROCEDURE — 700104 HCHG RX REV CODE 254

## 2018-12-12 PROCEDURE — 160002 HCHG RECOVERY MINUTES (STAT): Performed by: OBSTETRICS & GYNECOLOGY

## 2018-12-12 PROCEDURE — 160025 RECOVERY II MINUTES (STATS): Performed by: OBSTETRICS & GYNECOLOGY

## 2018-12-12 PROCEDURE — 160035 HCHG PACU - 1ST 60 MINS PHASE I: Performed by: OBSTETRICS & GYNECOLOGY

## 2018-12-12 PROCEDURE — 700111 HCHG RX REV CODE 636 W/ 250 OVERRIDE (IP): Performed by: ANESTHESIOLOGY

## 2018-12-12 PROCEDURE — 160036 HCHG PACU - EA ADDL 30 MINS PHASE I: Performed by: OBSTETRICS & GYNECOLOGY

## 2018-12-12 PROCEDURE — 160029 HCHG SURGERY MINUTES - 1ST 30 MINS LEVEL 4: Performed by: OBSTETRICS & GYNECOLOGY

## 2018-12-12 PROCEDURE — 700111 HCHG RX REV CODE 636 W/ 250 OVERRIDE (IP)

## 2018-12-12 PROCEDURE — 160046 HCHG PACU - 1ST 60 MINS PHASE II: Performed by: OBSTETRICS & GYNECOLOGY

## 2018-12-12 PROCEDURE — 160009 HCHG ANES TIME/MIN: Performed by: OBSTETRICS & GYNECOLOGY

## 2018-12-12 PROCEDURE — 501411 HCHG SPONGE, BABY LAP W/O RINGS: Performed by: OBSTETRICS & GYNECOLOGY

## 2018-12-12 PROCEDURE — 500892 HCHG PACK, PERI-GYN: Performed by: OBSTETRICS & GYNECOLOGY

## 2018-12-12 PROCEDURE — 160041 HCHG SURGERY MINUTES - EA ADDL 1 MIN LEVEL 4: Performed by: OBSTETRICS & GYNECOLOGY

## 2018-12-12 PROCEDURE — A4338 INDWELLING CATHETER LATEX: HCPCS | Performed by: OBSTETRICS & GYNECOLOGY

## 2018-12-12 PROCEDURE — 160048 HCHG OR STATISTICAL LEVEL 1-5: Performed by: OBSTETRICS & GYNECOLOGY

## 2018-12-12 PROCEDURE — 500901 HCHG PACKING, VAG 2 X-RAY: Performed by: OBSTETRICS & GYNECOLOGY

## 2018-12-12 PROCEDURE — 501838 HCHG SUTURE GENERAL: Performed by: OBSTETRICS & GYNECOLOGY

## 2018-12-12 PROCEDURE — 700101 HCHG RX REV CODE 250

## 2018-12-12 PROCEDURE — 700102 HCHG RX REV CODE 250 W/ 637 OVERRIDE(OP): Performed by: ANESTHESIOLOGY

## 2018-12-12 PROCEDURE — A9270 NON-COVERED ITEM OR SERVICE: HCPCS | Performed by: ANESTHESIOLOGY

## 2018-12-12 RX ORDER — HALOPERIDOL 5 MG/ML
1 INJECTION INTRAMUSCULAR
Status: DISCONTINUED | OUTPATIENT
Start: 2018-12-12 | End: 2018-12-12 | Stop reason: HOSPADM

## 2018-12-12 RX ORDER — DIPHENHYDRAMINE HYDROCHLORIDE 50 MG/ML
12.5 INJECTION INTRAMUSCULAR; INTRAVENOUS
Status: DISCONTINUED | OUTPATIENT
Start: 2018-12-12 | End: 2018-12-12 | Stop reason: HOSPADM

## 2018-12-12 RX ORDER — SODIUM CHLORIDE, SODIUM LACTATE, POTASSIUM CHLORIDE, CALCIUM CHLORIDE 600; 310; 30; 20 MG/100ML; MG/100ML; MG/100ML; MG/100ML
INJECTION, SOLUTION INTRAVENOUS CONTINUOUS
Status: DISCONTINUED | OUTPATIENT
Start: 2018-12-12 | End: 2018-12-12 | Stop reason: HOSPADM

## 2018-12-12 RX ORDER — OXYCODONE HCL 5 MG/5 ML
10 SOLUTION, ORAL ORAL
Status: COMPLETED | OUTPATIENT
Start: 2018-12-12 | End: 2018-12-12

## 2018-12-12 RX ORDER — MEPERIDINE HYDROCHLORIDE 25 MG/ML
6.25 INJECTION INTRAMUSCULAR; INTRAVENOUS; SUBCUTANEOUS
Status: DISCONTINUED | OUTPATIENT
Start: 2018-12-12 | End: 2018-12-12 | Stop reason: HOSPADM

## 2018-12-12 RX ORDER — OXYCODONE HCL 5 MG/5 ML
5 SOLUTION, ORAL ORAL
Status: COMPLETED | OUTPATIENT
Start: 2018-12-12 | End: 2018-12-12

## 2018-12-12 RX ORDER — ONDANSETRON 2 MG/ML
4 INJECTION INTRAMUSCULAR; INTRAVENOUS
Status: DISCONTINUED | OUTPATIENT
Start: 2018-12-12 | End: 2018-12-12 | Stop reason: HOSPADM

## 2018-12-12 RX ORDER — PROMETHAZINE HYDROCHLORIDE 25 MG/1
12.5 SUPPOSITORY RECTAL EVERY 4 HOURS PRN
Status: DISCONTINUED | OUTPATIENT
Start: 2018-12-12 | End: 2018-12-12 | Stop reason: HOSPADM

## 2018-12-12 RX ORDER — SODIUM CHLORIDE, SODIUM LACTATE, POTASSIUM CHLORIDE, CALCIUM CHLORIDE 600; 310; 30; 20 MG/100ML; MG/100ML; MG/100ML; MG/100ML
INJECTION, SOLUTION INTRAVENOUS ONCE
Status: COMPLETED | OUTPATIENT
Start: 2018-12-12 | End: 2018-12-12

## 2018-12-12 RX ORDER — ACETAMINOPHEN 500 MG
1000 TABLET ORAL ONCE
Status: DISCONTINUED | OUTPATIENT
Start: 2018-12-12 | End: 2018-12-12

## 2018-12-12 RX ORDER — LIDOCAINE HYDROCHLORIDE AND EPINEPHRINE 10; 10 MG/ML; UG/ML
INJECTION, SOLUTION INFILTRATION; PERINEURAL
Status: DISCONTINUED | OUTPATIENT
Start: 2018-12-12 | End: 2018-12-12 | Stop reason: HOSPADM

## 2018-12-12 RX ORDER — MIDAZOLAM HYDROCHLORIDE 1 MG/ML
INJECTION INTRAMUSCULAR; INTRAVENOUS
Status: DISCONTINUED
Start: 2018-12-12 | End: 2018-12-12 | Stop reason: HOSPADM

## 2018-12-12 RX ADMIN — SODIUM CHLORIDE, SODIUM LACTATE, POTASSIUM CHLORIDE, CALCIUM CHLORIDE: 600; 310; 30; 20 INJECTION, SOLUTION INTRAVENOUS at 11:30

## 2018-12-12 RX ADMIN — Medication 5 MG: at 13:40

## 2018-12-12 ASSESSMENT — PAIN SCALES - GENERAL
PAINLEVEL_OUTOF10: 2
PAINLEVEL_OUTOF10: 2
PAINLEVEL_OUTOF10: 4
PAINLEVEL_OUTOF10: 6
PAINLEVEL_OUTOF10: 5
PAINLEVEL_OUTOF10: 7

## 2018-12-12 NOTE — H&P
DATE OF ADMISSION:  12/12/2018    ADMISSION DIAGNOSIS:  Symptomatic vaginal prolapse.    HISTORY OF PRESENT ILLNESS:  This lady is being admitted for anterior and   posterior colporrhaphy ___ vaginal apical suspension.  This lady presented to   me recently as a new patient with complaints of increasing pelvic pressure and   a sensation of something bulging through the vagina.  She did not describe   any bowel or bladder dysfunction.  She had a previous hysterectomy with   bilateral salpingo-oophorectomy, having had multiple pregnancies, none to term   and 3 tubal ectopic pregnancies.  She is otherwise a healthy lady.  She does   admit in the past having had abnormal Pap smears and prior to hysterectomy had   cryosurgery of the cervix.  She also describes to me a history of being   exposed to GIORGI during her mother's pregnancy with her.    SOCIAL HISTORY:  She is a nonsmoker.    ALLERGIES:  She admits to allergies to SULFA.    MEDICATIONS:  The only medication she takes is thyroid replacement.    PAST SURGICAL HISTORY:  She also has had a cholecystectomy and no sphincter   operation and as described she had multiple ectopic pregnancies.    REVIEW OF SYSTEMS:  Denies any significant complication or problems with her   previous multiple surgeries with respect to anesthesia.  She has especially   never had a problem with any bleeding diathesis.  She specifically denies any   cardiorespiratory problems, any endocrine problems, never had seizure or   convulsions, never had blood transfusion.  She has a history of chronic   irritable bowel syndrome.    PHYSICAL EXAMINATION:  GENERAL:  She is a healthy looking lady.  VITAL SIGNS:  She is slightly overweight at 178 pounds.  She is 5 feet 4   inches.  Blood pressure ___.  SKIN:  Color was good.  NECK:  No goiter.  LUNGS:  Clear.  HEART:  Sounds normal.  PELVIC:  Somewhat limited by her size, but no obvious hepatosplenomegaly.  No   pelvic abdominal mass.  She has moderate  vaginal cuff prolapse.  She has got a   cystocele.  She got a prominent enterocele.  She got a rectocele.    The patient is fully counseled as to surgery and what she can expect.    Detailed evaluation of the bladder function revealed no evidence of stress   urinary incontinence with the bladder filled with 300 mL of normal saline, but   did describe to that despite correcting her anatomical problem there is a   possibility that in the future she might develop stress urinary incontinence   and she might require further surgery at that time.  We will not perform any   urethral sling procedures.  All acute and chronic local and generalized   complications applicable to surgery were addressed.  Prolonged bladder   drainage was addressed.  Failure of the surgery was discussed.  I answered all   her questions.  She will make sure she has an effective bowel movement before   the surgery and she will present to [facility] at least 2 hours   before the scheduled time.  I gave informed consent statement to take home,   evaluate, read, call for questions or else bring her to the hospital in the   morning of the surgery.       ____________________________________     SONNY GILBERT MD    HRP / NTS    DD:  12/11/2018 20:52:05  DT:  12/11/2018 22:44:58    D#:  [id number]  Job#:  [id number]

## 2018-12-12 NOTE — H&P
ADMISSION DIAGNOSIS:  Symptomatic vaginal prolapse.    HISTORY OF PRESENT ILLNESS:  This lady is being admitted for anterior and   posterior colporrhaphy ____ vaginal apical suspension.  This lady presented to   me recently as a new patient with complaints of increasing pelvic pressure   and a sensation of something bulging through the vagina.  She did not describe   any bowel or bladder dysfunction.  She had a previous hysterectomy with   bilateral salpingo-oophorectomy, having had multiple pregnancies, none to term   and 3 tubal ectopic pregnancies.  She is otherwise a healthy lady.  She does   admit in the past having had abnormal Pap smears and prior to hysterectomy had   cryosurgery of the cervix.  She also describes to me a history of being   exposed to GIORGI during her mother's pregnancy with her.    SOCIAL HISTORY:  She is a nonsmoker.    ALLERGIES:  She admits to allergies to SULFA.    MEDICATIONS:  The only medication she takes is thyroid replacement.    PAST SURGICAL HISTORY:  She also has had a cholecystectomy and no sphincter   operation and as described she had multiple ectopic pregnancies.    REVIEW OF SYSTEMS:  Denies any significant complication or problems with her   previous multiple surgeries with respect to anesthesia.  She has especially   never had a problem with any bleeding diathesis.  She specifically denies any   cardiorespiratory problems, any endocrine problems, never had seizure or   convulsions, never had blood transfusion.  She has a history of chronic   irritable bowel syndrome.    PHYSICAL EXAMINATION:  GENERAL:  She is a healthy looking lady.  VITAL SIGNS:  She is slightly overweight at 178 pounds.  She is 5 feet 4   inches.  Blood pressure ____.  SKIN:  Color was good.  NECK:  No goiter.  LUNGS:  Clear.  HEART:  Sounds normal.  PELVIC:  Somewhat limited by her size, but no obvious hepatosplenomegaly.  No   pelvic abdominal mass.  She has moderate vaginal cuff prolapse.  She has got  a   cystocele.  She got a prominent enterocele.  She got a rectocele.    The patient is fully counseled as to surgery and what she can expect.    Detailed evaluation of the bladder function revealed no evidence of stress   urinary incontinence with the bladder filled with 300 mL of normal saline, but   did describe to that despite correcting her anatomical problem there is a   possibility that in the future she might develop stress urinary incontinence   and she might require further surgery at that time.  We will not perform any   urethral sling procedures.  All acute and chronic local and generalized   complications applicable to surgery were addressed.  Prolonged bladder   drainage was addressed.  Failure of the surgery was discussed.  I answered all   her questions.  She will make sure she has an effective bowel movement before   the surgery and she will present to [facility] at least 2 hours   before the scheduled time.  I gave informed consent statement to take home,   evaluate, read, call for questions or else bring her to the hospital in the   morning of the surgery.       ____________________________________     MD LYUBOV HERNANDEZ / BRISEIDA    DD:  12/11/2018 20:52:05  DT:  12/11/2018 22:44:58    D#:  [id number]  Job#:  [id number]

## 2018-12-13 NOTE — OR NURSING
1625- Report from Madeline for lunch coverage.  Bladder scan after void, less than 50ml noted on bladder scan.    1640- Pt resting in recliner, denies any needs or complaints,  at bedside.    1700- Report to Madeline.

## 2018-12-13 NOTE — OR NURSING
1312- to pacu connected to all monitors report recvd from dr shyanne sanchez and or rn   Pt is awake but drowsy   1356- medicated iv and po pain meds   1410- to bedside pt states pain is at tolerable level for her.     1521- resting comfortable updated on plan of care   1600- bladder back filled 300cc NS then paz dcd per orders pt then up to bathroom and was able to void 300cc  pvr was checked by bladder scan and was less than 50cc pt then assisted to get dressed and placed into a recliner chair vag packing removed per orders only scant bleeding noted to packing no further bleeding noted   1700- pt states ready to go home all vss and all dc criteria met reviewed all dc instructions with pt and spouse   1925-iv dcd assited into wheelchair and dc to home with all belongings

## 2018-12-13 NOTE — DISCHARGE INSTRUCTIONS
ACTIVITY: Rest and take it easy for the first 24 hours.  A responsible adult is recommended to remain with you during that time.  It is normal to feel sleepy.  We encourage you to not do anything that requires balance, judgment or coordination.    MILD FLU-LIKE SYMPTOMS ARE NORMAL. YOU MAY EXPERIENCE GENERALIZED MUSCLE ACHES, THROAT IRRITATION, HEADACHE AND/OR SOME NAUSEA.    FOR 24 HOURS DO NOT:  Drive, operate machinery or run household appliances.  Drink beer or alcoholic beverages.   Make important decisions or sign legal documents.    SPECIAL INSTRUCTIONS: *see hysterectomy discharge instruction sheet **    DIET: To avoid nausea, slowly advance diet as tolerated, avoiding spicy or greasy foods for the first day.  Add more substantial food to your diet according to your physician's instructions.  Babies can be fed formula or breast milk as soon as they are hungry.  INCREASE FLUIDS AND FIBER TO AVOID CONSTIPATION.    SURGICAL DRESSING/BATHING: *see hysterectomy discharge instruction sheet**    FOLLOW-UP APPOINTMENT:  A follow-up appointment should be arranged with your doctor; call to schedule. (Keep as already scheduled)     You should CALL YOUR PHYSICIAN if you develop:  Fever greater than 101 degrees F.  Pain not relieved by medication, or persistent nausea or vomiting.  Excessive bleeding (blood soaking through dressing) or unexpected drainage from the wound.  Extreme redness or swelling around the incision site, drainage of pus or foul smelling drainage.  Inability to urinate or empty your bladder within 8 hours.  Problems with breathing or chest pain.    You should call 911 if you develop problems with breathing or chest pain.  If you are unable to contact your doctor or surgical center, you should go to the nearest emergency room or urgent care center.  Physician's telephone #: *[phone]**    If any questions arise, call your doctor.  If your doctor is not available, please feel free to call the  Surgical Center at [phone].  The Center is open Monday through Friday from 7AM to 7PM.  You can also call the HEALTH HOTLINE open 24 hours/day, 7 days/week and speak to a nurse at [phone], or toll free at [phone].    A registered nurse may call you a few days after your surgery to see how you are doing after your procedure.    MEDICATIONS: Resume taking daily medication.  Take prescribed pain medication with food.  If no medication is prescribed, you may take non-aspirin pain medication if needed.  PAIN MEDICATION CAN BE VERY CONSTIPATING.  Take a stool softener or laxative such as senokot, pericolace, or milk of magnesia if needed.    Prescription given for *norco **.  Last pain medication given at *1:40pm **.    If your physician has prescribed pain medication that includes Acetaminophen (Tylenol), do not take additional Acetaminophen (Tylenol) while taking the prescribed medication.    Depression / Suicide Risk    As you are discharged from this [facility] facility, it is important to learn how to keep safe from harming yourself.    Recognize the warning signs:  · Abrupt changes in personality, positive or negative- including increase in energy   · Giving away possessions  · Change in eating patterns- significant weight changes-  positive or negative  · Change in sleeping patterns- unable to sleep or sleeping all the time   · Unwillingness or inability to communicate  · Depression  · Unusual sadness, discouragement and loneliness  · Talk of wanting to die  · Neglect of personal appearance   · Rebelliousness- reckless behavior  · Withdrawal from people/activities they love  · Confusion- inability to concentrate     If you or a loved one observes any of these behaviors or has concerns about self-harm, here's what you can do:  · Talk about it- your feelings and reasons for harming yourself  · Remove any means that you might use to hurt yourself (examples: pills, rope, extension cords,  firearm)  · Get professional help from the community (Mental Health, Substance Abuse, psychological counseling)  · Do not be alone:Call your Safe Contact- someone whom you trust who will be there for you.  · Call your local CRISIS HOTLINE 688-3608 or 167-844-4303  · Call your local Children's Mobile Crisis Response Team [place] (735) 979-8967 or www.[employer]erenium  · Call the toll free National Suicide Prevention Hotlines   · National Suicide Prevention Lifeline 362-679-CQAE (3167)  · National Hope Line Network 800-SUICIDE (290-7536)

## 2018-12-13 NOTE — OP REPORT
DATE OF SERVICE:  12/12/2018    PROCEDURES:  Anterior, posterior colporrhaphy with enterocele repair and   bilateral uterosacral vaginal vault suspension.    PREOPERATIVE DIAGNOSES:  Symptomatic vaginal prolapse with cystocele,   rectocele, and enterocele.    POSTOPERATIVE DIAGNOSES:  Symptomatic vaginal prolapse with cystocele,   rectocele, and enterocele.    SURGEON:  Pierce Murrieta MD    ASSISTANT SURGEON:   Barrett Velarde MD    ANESTHESIOLOGIST:  Shara Lopez MD    ANESTHESIA:  General endotracheal anesthesia.    RECOGNIZED COMPLICATIONS:  None.    BLOOD LOSS:  Less than 100 mL    FINDINGS:  Intact bladder, no pathology, no trauma and both ureters functioned   at completion of the procedure.    TECHNIQUE:  Patient was brought to the operating room.  Once anesthesia was   secured, the exam under anesthesia confirmed the preoperative findings.  The   patient was placed in the [facility] stirrups, positioned by myself.  I paid   attention to positioning of the upper and lower extremities, placing them in a   neutral station.  I placed a Aguilar catheter for about 100 mL of clear urine.    Careful inspection under anesthesia confirmed significant vault prolapse,   large midline cystocele, lateral cystocele, rectocele, and enterocele.  The   patient's perineum, vagina and abdomen were prepped and draped in normal   sterile fashion.  She was given intravenous antibiotics.  I grasped the cuff   of the vagina and placed angle sutures first in the left and then the right   side.  The Aguilar catheter was functioning.  I then injected the anterior   vaginal mucosa with 1% lidocaine, epinephrine mixture.  I placed a vertical   incision in the midline and dissected this up towards the urethrovesical   angle.  I then used sharp dissection to dissect the endopelvic fascia   laterally in each direction.  When I was comfortable that I had resected this   enough to reduce the midline cystocele, I removed a small  amount of vaginal   mucosa in the midline and then reduced the midline cystocele on multiple   figure-of-eight 2-0 Vicryl sutures.  I then further dissected the defect in   the midline and opened up the peritoneal cavity.  By doing this, I was able to   pack away the bowel from the operative site and then identifying the   uterosacral ligaments bilaterally, I placed 4 interrupted 0 PDS sutures   starting on the right side from a lateral-to-medial direction away from the   ureter.  I then repeated this on the patient's left side with further 0 PDS   sutures.  I then took the needle end of the suture after removing the packing   from the pelvis and sewing at 09:30 and again at about 11 o'clock, I   incorporated the anterior vaginal wall and endopelvic fascia at these levels.    I repeated this on the left side at about 02:30 and at about 1 o'clock.  I   then took a free needle and attached the free end of these PDS sutures   starting on the right side by incorporated posterior peritoneum endopelvic   fascia and the vagina mucosa.  I did this at about 08:30 and about 06:30.  PDS   on the left side at about 03:30 and at about 5 o'clock.  I then placed the   patient in extremely steep Trendelenburg position.  I placed a Nirav tractor   in the opening of the vaginal cuff and then I tied these sutures down starting   on the right side, I brought it across the left side, could be a good vaginal   vault apical support and complete reduction of lateral cystocele and   reduction of the large prolapsing rectocele and complete reduction of the   enterocele.  I then performed diagnostic cystoscopy and had given indigo   carmine, both ureters functioned immediately.  The bladder was intact.  No   pathology, no trauma.  I replaced the Aguilar catheter.  I then performed the   rectal exam and there was no intrusion of any suture in the rectum.  I then   oversewed the vaginal cuff with a running 2-0 Vicryl sutures.  There was total    hemostasis.  There was no bleeding.  Aguilar catheter was in place.  I placed   the vaginal pack in the vagina.  She was awoken, extubated and transferred to   the recovery room.       ____________________________________     MD LYUBOV HERNANDEZ / BRISEIDA    DD:  12/12/2018 20:17:19  DT:  12/12/2018 21:11:25    D#:  [id number]  Job#:  [id number]    cc: BERTO DELGADO

## 2019-04-15 ENCOUNTER — SLEEP CENTER VISIT (OUTPATIENT)
Dept: SLEEP MEDICINE | Facility: MEDICAL CENTER | Age: 64
End: 2019-04-15
Payer: COMMERCIAL

## 2019-04-15 VITALS
OXYGEN SATURATION: 95 % | TEMPERATURE: 98.1 F | RESPIRATION RATE: 16 BRPM | BODY MASS INDEX: 30.32 KG/M2 | HEIGHT: 65 IN | SYSTOLIC BLOOD PRESSURE: 122 MMHG | DIASTOLIC BLOOD PRESSURE: 78 MMHG | HEART RATE: 75 BPM | WEIGHT: 182 LBS

## 2019-04-15 DIAGNOSIS — F51.04 PSYCHOPHYSIOLOGICAL INSOMNIA: ICD-10-CM

## 2019-04-15 DIAGNOSIS — Z87.891 FORMER SMOKER: ICD-10-CM

## 2019-04-15 DIAGNOSIS — G47.33 OBSTRUCTIVE SLEEP APNEA: Chronic | ICD-10-CM

## 2019-04-15 DIAGNOSIS — K21.9 GASTROESOPHAGEAL REFLUX DISEASE, ESOPHAGITIS PRESENCE NOT SPECIFIED: ICD-10-CM

## 2019-04-15 PROCEDURE — 99214 OFFICE O/P EST MOD 30 MIN: CPT | Performed by: NURSE PRACTITIONER

## 2019-04-15 NOTE — PROGRESS NOTES
Chief Complaint   Patient presents with   • Apnea     BiPAP: 16/14 cm        HPI:  Acacai Arboleda is a 64 y.o. year old female here today for follow-up on JAYSHREE.  Prior polysomnogram indicates severe sleep apnea with AHI of 85.  She is currently using BiPAP 16/14 Summers H2O nightly.  Compliance card 3/16/19 through 4/14/19 indicates 100% compliance, average nightly use of 7 hours, 10 minutes of mask leak and an overall AHI of 2.4. She changed her nasal pillows 3 nights ago and fit has improved. She tolerates pressure. She notes the last month of sleep to not be so great. She notes general stress with life but no significantly stressful events right now. She denies any major changes in health.  She has been seen by Dr. roque for CBT therapy with benefit.  She does have a history of insomnia.  She has successfully weaned off Lunesta and uses melatonin nightly.  She has tried Ambien and Belsomra in the past without subjective benefit.  BMI 30.  She has gained 7 pounds since last office visit.  She denies cardiac or respiratory symptoms.      ROS: As per HPI and otherwise negative if not stated.    Past Medical History:   Diagnosis Date   • Allergic rhinitis    • Arthritis    • Asthma    • Bowel habit changes     IBS   • Cancer (HCC) 1984    uterine   • Cataract     no surgery yet   • CHRONIC SINUSITIS 9/11/2009   • Diabetes (HCC)     diet controlled   • Dyslipidemia 9/11/2009   • GERD (gastroesophageal reflux disease) 9/11/2009   • Hemorrhoids    • Herpes 9/11/2009   • Hiatus hernia syndrome    • Hypothyroid 9/11/2009   • IBS (irritable bowel syndrome) 9/11/2009   • Pain     hands knees, hips   • Pneumonia 1986   • Sleep apnea     bipap   • Snoring 11/19/2013   • Urinary bladder disorder     prolapse       Past Surgical History:   Procedure Laterality Date   • ANTERIOR AND POSTERIOR REPAIR  12/12/2018    Procedure: ANTERIOR AND POSTERIOR REPAIR - COLPORRHAPHY;  Surgeon: Pierce Murrieta M.D.;  Location:  "SURGERY SAME DAY [facility];  Service: Gynecology   • VAGINAL SUSPENSION  12/12/2018    Procedure: VAGINAL SUSPENSION - UTEROSACRAL LIGAMENT;  Surgeon: Pierce Murrieta M.D.;  Location: SURGERY SAME DAY [facility];  Service: Gynecology   • CHOLECYSTECTOMY  1995   • GYN SURGERY  1985    hysterectomy   • GYN SURGERY  1981    fertility surgery?   • GYN SURGERY  1979    tubal reconstruction   • APPENDECTOMY     • GYN SURGERY  1977,1978,1980    tubal pregnancy   • OTHER      RECTAL SPINCTER,2001;ASHLEY,1985;TUBAL PREGNANCY 1979&77;CRYOSURGERY 1983       Family History   Problem Relation Age of Onset   • Heart Disease Father         AAA   • Cancer Mother         UTERINE   • Heart Disease Mother         AFIB   • Cancer Maternal Aunt         BREAST   • Cancer Maternal Uncle         COLON       Social History     Social History   • Marital status:      Spouse name: N/A   • Number of children: N/A   • Years of education: N/A     Occupational History   • Not on file.     Social History Main Topics   • Smoking status: Former Smoker     Packs/day: 3.00     Years: 20.00     Types: Cigarettes     Quit date: 12/6/1986   • Smokeless tobacco: Never Used      Comment: QUIT 1986   • Alcohol use No   • Drug use: No   • Sexual activity: No     Other Topics Concern   • Not on file     Social History Narrative   • No narrative on file       Allergies as of 04/15/2019 - Reviewed 04/15/2019   Allergen Reaction Noted   • Sulfa drugs Shortness of Breath, Rash, and Swelling 09/11/2009        @Vital signs for this encounter:  Vitals:    04/15/19 1337   Height: 1.651 m (5' 5\")   Weight: 82.6 kg (182 lb)   Weight % change since last entry.: 0 %   BP: 122/78   Pulse: 75   BMI (Calculated): 30.29   Resp: 16   Temp: 36.7 °C (98.1 °F)   TempSrc: Temporal   O2 sat % room air: 95 %       Current medications as of today   Current Outpatient Prescriptions   Medication Sig Dispense Refill   • CALCIUM PO Take 1,200 mg by mouth every day.     • " BIOTIN PO Take  by mouth every day.     • levothyroxine (SYNTHROID) 175 MCG Tab TAKE 1 TABLET BY MOUTH  DAILY 90 Tab 3   • Propylene Glycol (SYSTANE BALANCE) 0.6 % Solution by Ophthalmic route 2 Times a Day.     • Melatonin 5 MG Cap Take  by mouth.     • loratadine (CLARITIN) 10 MG TABS Take 10 mg by mouth every day.     • VITAMIN E by Does not apply route.     • mometasone (NASONEX) 50 MCG/ACT nasal spray Spray 2 Sprays in nose every day. Each nostril 1 Inhaler 2     No current facility-administered medications for this visit.          Physical Exam:   Gen:           Alert and oriented, No apparent distress. Mood and affect appropriate, normal interaction with examiner.  Eyes:          PERRL, EOM intact, sclere white, conjunctive moist. Glasses.  Ears:          Not examined.   Hearing:     Grossly intact.  Nose:          Normal, no lesions or deformities.  Dentition:    Good dentition.  Oropharynx:   Tongue normal.  Mallampati Classification: not examined.  Neck:        Supple, trachea midline, no masses.  Respiratory Effort: No intercostal retractions or use of accessory muscles.   Lung Auscultation:      Clear to auscultation bilaterally; no rales, rhonchi or wheezing.  CV:            Regular rate and rhythm. No murmurs, rubs or gallops.  Abd:           Not examined.   Lymphadenopathy: Not examined.  Gait and Station: Normal.  Digits and Nails: No clubbing, cyanosis, petechiae, or nodes.   Cranial Nerves: II-XII grossly intact.  Skin:        No rashes, lesions or ulcers noted.               Ext:           No cyanosis or edema.      Assessment:  1. Obstructive sleep apnea     2. Psychophysiological insomnia     3. Gastroesophageal reflux disease, esophagitis presence not specified     4. BMI 30.0-30.9,adult  Height And Weight   5. Former smoker         Immunizations:    Flu:10/2018  Pneumovax 23:2018  Prevnar 13:2017    Plan: JAYSHREE is clinically stable.  1. Continue nightly BIPAP 16/14cm H20.  DME mask/supplies.  2.  Discussed sleep hygiene.  3. Encouraged weight loss.  4.  Continue CBT techniques to help initiate/maintain sleep and melatonin 5mg qhs.  5. Follow up in 6-12mos for compliance check, sooner if needed.    Please note that this dictation was created using voice recognition software. I have made every reasonable attempt to correct obvious errors, but it is possible there are errors of grammar and possibly content that I did not discover before finalizing the note.

## 2019-07-23 ENCOUNTER — OFFICE VISIT (OUTPATIENT)
Dept: URGENT CARE | Facility: PHYSICIAN GROUP | Age: 64
End: 2019-07-23
Payer: COMMERCIAL

## 2019-07-23 VITALS
DIASTOLIC BLOOD PRESSURE: 72 MMHG | OXYGEN SATURATION: 98 % | TEMPERATURE: 97.7 F | SYSTOLIC BLOOD PRESSURE: 110 MMHG | BODY MASS INDEX: 31.65 KG/M2 | WEIGHT: 190 LBS | RESPIRATION RATE: 16 BRPM | HEART RATE: 78 BPM | HEIGHT: 65 IN

## 2019-07-23 DIAGNOSIS — H05.229 ORBITAL SWELLING: ICD-10-CM

## 2019-07-23 DIAGNOSIS — H00.015 HORDEOLUM EXTERNUM OF LEFT LOWER EYELID: ICD-10-CM

## 2019-07-23 PROCEDURE — 99214 OFFICE O/P EST MOD 30 MIN: CPT | Performed by: PHYSICIAN ASSISTANT

## 2019-07-23 RX ORDER — AMOXICILLIN 875 MG/1
875 TABLET, COATED ORAL 2 TIMES DAILY
Qty: 14 TAB | Refills: 0 | Status: SHIPPED | OUTPATIENT
Start: 2019-07-23 | End: 2019-07-30

## 2019-07-23 RX ORDER — CLINDAMYCIN HYDROCHLORIDE 300 MG/1
300 CAPSULE ORAL 3 TIMES DAILY
Qty: 15 CAP | Refills: 0 | Status: SHIPPED | OUTPATIENT
Start: 2019-07-23 | End: 2019-07-28

## 2019-07-23 ASSESSMENT — ENCOUNTER SYMPTOMS
NECK PAIN: 0
DIARRHEA: 0
DIZZINESS: 0
VOMITING: 0
EYE DISCHARGE: 1
EYE PAIN: 0
MYALGIAS: 1
FOREIGN BODY SENSATION: 0
BLURRED VISION: 1
CHILLS: 0
EYE REDNESS: 1
HEADACHES: 1
DOUBLE VISION: 0
PHOTOPHOBIA: 0
FEVER: 0

## 2019-07-23 NOTE — PROGRESS NOTES
"Subjective:      Acacia Arboleda is a 64 y.o. female who presents with Eye Problem (Lt eye drainage, pain, and swelling around the orbital area x3days )            Patient is a pleasant 64-year-old female presents to urgent care with concern regarding left lower eyelid pain and swelling with swelling to her left upper cheek as well for the last 3 days.  She became more concerned this morning when she developed crusting to her eyelid this morning and was unable to open it for several minutes.  She is been utilizing cool compresses with mild improvement of symptoms.  She denies any fevers but does report notable fatigue and just feels \"off \".  She denies any specific eyeball pain or foreign body sensation.  She does wear glasses however denies contact lens usage.      Eye Problem    The left eye is affected. This is a new problem. Episode onset: 3 days ago. The problem occurs constantly. The problem has been gradually worsening. There was no injury mechanism. The pain is at a severity of 4/10. The pain is moderate. There is no known exposure to pink eye. She does not wear contacts. Associated symptoms include blurred vision, an eye discharge and eye redness. Pertinent negatives include no double vision, fever, foreign body sensation, photophobia or vomiting.       Review of Systems   Constitutional: Negative for chills and fever.   Eyes: Positive for blurred vision, discharge and redness. Negative for double vision, photophobia and pain.   Gastrointestinal: Negative for diarrhea and vomiting.   Musculoskeletal: Positive for myalgias. Negative for neck pain.   Skin: Negative for itching and rash.   Neurological: Positive for headaches. Negative for dizziness.   All other systems reviewed and are negative.         Objective:     /72   Pulse 78   Temp 36.5 °C (97.7 °F) (Temporal)   Resp 16   Ht 1.651 m (5' 5\")   Wt 86.2 kg (190 lb)   LMP 03/01/1985   SpO2 98%   BMI 31.62 kg/m²    PMH:  has a past " medical history of Allergic rhinitis; Arthritis; Asthma; Bowel habit changes; Cancer ([facility]) (1984); Cataract; CHRONIC SINUSITIS (9/11/2009); Diabetes (HCC); Dyslipidemia (9/11/2009); GERD (gastroesophageal reflux disease) (9/11/2009); Hemorrhoids; Herpes (9/11/2009); Hiatus hernia syndrome; Hypothyroid (9/11/2009); IBS (irritable bowel syndrome) (9/11/2009); Pain; Pneumonia (1986); Sleep apnea; Snoring (11/19/2013); and Urinary bladder disorder.  MEDS:   Current Outpatient Prescriptions:   •  clindamycin (CLEOCIN) 300 MG Cap, Take 1 Cap by mouth 3 times a day for 5 days., Disp: 15 Cap, Rfl: 0  •  amoxicillin (AMOXIL) 875 MG tablet, Take 1 Tab by mouth 2 times a day for 7 days., Disp: 14 Tab, Rfl: 0  •  CALCIUM PO, Take 1,200 mg by mouth every day., Disp: , Rfl:   •  BIOTIN PO, Take  by mouth every day., Disp: , Rfl:   •  levothyroxine (SYNTHROID) 175 MCG Tab, TAKE 1 TABLET BY MOUTH  DAILY, Disp: 90 Tab, Rfl: 3  •  Propylene Glycol (SYSTANE BALANCE) 0.6 % Solution, by Ophthalmic route 2 Times a Day., Disp: , Rfl:   •  Melatonin 5 MG Cap, Take  by mouth., Disp: , Rfl:   •  loratadine (CLARITIN) 10 MG TABS, Take 10 mg by mouth every day., Disp: , Rfl:   •  VITAMIN E, by Does not apply route., Disp: , Rfl:   •  mometasone (NASONEX) 50 MCG/ACT nasal spray, Spray 2 Sprays in nose every day. Each nostril, Disp: 1 Inhaler, Rfl: 2  ALLERGIES:   Allergies   Allergen Reactions   • Sulfa Drugs Shortness of Breath, Rash and Swelling     body     SURGHX:   Past Surgical History:   Procedure Laterality Date   • ANTERIOR AND POSTERIOR REPAIR  12/12/2018    Procedure: ANTERIOR AND POSTERIOR REPAIR - COLPORRHAPHY;  Surgeon: Pierce Murrieta M.D.;  Location: SURGERY SAME DAY [facility];  Service: Gynecology   • VAGINAL SUSPENSION  12/12/2018    Procedure: VAGINAL SUSPENSION - UTEROSACRAL LIGAMENT;  Surgeon: Pierce Murrieta M.D.;  Location: SURGERY SAME DAY [facility];  Service: Gynecology   • CHOLECYSTECTOMY  1995   • GYN  SURGERY  1985    hysterectomy   • GYN SURGERY  1981    fertility surgery?   • GYN SURGERY  1979    tubal reconstruction   • APPENDECTOMY     • GYN SURGERY  1977,1978,1980    tubal pregnancy   • OTHER      RECTAL SPINCTER,2001;ASHLEY,1985;TUBAL PREGNANCY 1979&77;CRYOSURGERY 1983     SOCHX:  reports that she quit smoking about 32 years ago. Her smoking use included Cigarettes. She has a 60.00 pack-year smoking history. She has never used smokeless tobacco. She reports that she does not drink alcohol or use drugs.  FH: Family history was reviewed, no pertinent findings to report    Physical Exam   Constitutional: She is oriented to person, place, and time. She appears well-developed and well-nourished. No distress.   HENT:   Head: Normocephalic and atraumatic.   Right Ear: External ear normal.   Left Ear: External ear normal.   Mouth/Throat: Oropharynx is clear and moist. No oropharyngeal exudate.   Eyes: Pupils are equal, round, and reactive to light. EOM are normal. Left eye exhibits hordeolum. No foreign body present in the left eye. Left conjunctiva is injected.   Slit lamp exam:       The left eye shows no corneal abrasion, no foreign body and no fluorescein uptake.       Noted erythema and edema to the left upper maxillary aspect of cheek- without discrete abscess formation. Without EOM- pain.   Betancur lamp used.    Neck: Normal range of motion. Neck supple. No tracheal deviation present.   Cardiovascular: Normal rate and regular rhythm.    No murmur heard.  Pulmonary/Chest: Effort normal and breath sounds normal. No respiratory distress.   Musculoskeletal: Normal range of motion. She exhibits no edema.   Lymphadenopathy:     She has no cervical adenopathy.   Neurological: She is alert and oriented to person, place, and time. Coordination normal.   Skin: Skin is warm. No rash noted.   Psychiatric: She has a normal mood and affect. Her behavior is normal. Judgment and thought content normal.   Vitals reviewed.               Assessment/Plan:     1. Hordeolum externum of left lower eyelid  - clindamycin (CLEOCIN) 300 MG Cap; Take 1 Cap by mouth 3 times a day for 5 days.  Dispense: 15 Cap; Refill: 0  - amoxicillin (AMOXIL) 875 MG tablet; Take 1 Tab by mouth 2 times a day for 7 days.  Dispense: 14 Tab; Refill: 0    2. Orbital swelling  - clindamycin (CLEOCIN) 300 MG Cap; Take 1 Cap by mouth 3 times a day for 5 days.  Dispense: 15 Cap; Refill: 0  - amoxicillin (AMOXIL) 875 MG tablet; Take 1 Tab by mouth 2 times a day for 7 days.  Dispense: 14 Tab; Refill: 0      Will cover patient with clindamycin and Amoxil for early preseptal cellulitis.  Patient without any ocular pain or pain with movements with extraocular movements.  Orbital cellulitis is unlikely.  She also is afebrile without tachycardia.  Patient is to reach out via my chart or is to have recheck in 2 to 3 days if limited improvement.  She also is to continue with warm hot compresses to assist with drainage.  Patient and her  both understand and agree with the plan.  Patient given precautionary s/sx that mandate immediate follow up and evaluation in the ED. Advised of risks of not doing so.    DDX, Supportive care, and indications for immediate follow-up discussed with patient.    Instructed to return to clinic or nearest emergency department if we are not available for any change in condition, further concerns, or worsening of symptoms.    The patient demonstrated a good understanding and agreed with the treatment plan.  Please note that this dictation was created using voice recognition software. I have made every reasonable attempt to correct obvious errors, but I expect that there are errors of grammar and possibly content that I did not discover before finalizing the note.

## 2019-08-14 ENCOUNTER — APPOINTMENT (RX ONLY)
Dept: URBAN - METROPOLITAN AREA CLINIC 4 | Facility: CLINIC | Age: 64
Setting detail: DERMATOLOGY
End: 2019-08-14

## 2019-08-14 DIAGNOSIS — L81.4 OTHER MELANIN HYPERPIGMENTATION: ICD-10-CM

## 2019-08-14 DIAGNOSIS — D18.0 HEMANGIOMA: ICD-10-CM

## 2019-08-14 DIAGNOSIS — D22 MELANOCYTIC NEVI: ICD-10-CM

## 2019-08-14 DIAGNOSIS — L82.1 OTHER SEBORRHEIC KERATOSIS: ICD-10-CM

## 2019-08-14 PROBLEM — D18.01 HEMANGIOMA OF SKIN AND SUBCUTANEOUS TISSUE: Status: ACTIVE | Noted: 2019-08-14

## 2019-08-14 PROBLEM — D22.61 MELANOCYTIC NEVI OF RIGHT UPPER LIMB, INCLUDING SHOULDER: Status: ACTIVE | Noted: 2019-08-14

## 2019-08-14 PROBLEM — D22.5 MELANOCYTIC NEVI OF TRUNK: Status: ACTIVE | Noted: 2019-08-14

## 2019-08-14 PROBLEM — D22.71 MELANOCYTIC NEVI OF RIGHT LOWER LIMB, INCLUDING HIP: Status: ACTIVE | Noted: 2019-08-14

## 2019-08-14 PROBLEM — D22.62 MELANOCYTIC NEVI OF LEFT UPPER LIMB, INCLUDING SHOULDER: Status: ACTIVE | Noted: 2019-08-14

## 2019-08-14 PROBLEM — D22.72 MELANOCYTIC NEVI OF LEFT LOWER LIMB, INCLUDING HIP: Status: ACTIVE | Noted: 2019-08-14

## 2019-08-14 PROCEDURE — ? COUNSELING

## 2019-08-14 PROCEDURE — ? SUNSCREEN RECOMMENDATIONS

## 2019-08-14 PROCEDURE — 99213 OFFICE O/P EST LOW 20 MIN: CPT

## 2019-08-14 ASSESSMENT — LOCATION ZONE DERM
LOCATION ZONE: LEG
LOCATION ZONE: FACE
LOCATION ZONE: TRUNK
LOCATION ZONE: ARM
LOCATION ZONE: NECK
LOCATION ZONE: HAND

## 2019-08-14 ASSESSMENT — LOCATION DETAILED DESCRIPTION DERM
LOCATION DETAILED: LEFT ULNAR DORSAL HAND
LOCATION DETAILED: RIGHT ANTERIOR PROXIMAL THIGH
LOCATION DETAILED: RIGHT DISTAL POSTERIOR UPPER ARM
LOCATION DETAILED: LEFT PROXIMAL POSTERIOR UPPER ARM
LOCATION DETAILED: RIGHT RIB CAGE
LOCATION DETAILED: LEFT SUPERIOR MEDIAL UPPER BACK
LOCATION DETAILED: LEFT INFERIOR ANTERIOR NECK
LOCATION DETAILED: RIGHT PROXIMAL DORSAL FOREARM
LOCATION DETAILED: RIGHT RADIAL DORSAL HAND
LOCATION DETAILED: PERIUMBILICAL SKIN
LOCATION DETAILED: RIGHT SUPERIOR MEDIAL MIDBACK
LOCATION DETAILED: LEFT PROXIMAL DORSAL FOREARM
LOCATION DETAILED: LEFT CENTRAL MALAR CHEEK
LOCATION DETAILED: RIGHT CENTRAL MALAR CHEEK
LOCATION DETAILED: SUPERIOR THORACIC SPINE
LOCATION DETAILED: LEFT ANTERIOR PROXIMAL THIGH

## 2019-08-14 ASSESSMENT — LOCATION SIMPLE DESCRIPTION DERM
LOCATION SIMPLE: RIGHT CHEEK
LOCATION SIMPLE: LEFT CHEEK
LOCATION SIMPLE: RIGHT THIGH
LOCATION SIMPLE: LEFT POSTERIOR UPPER ARM
LOCATION SIMPLE: LEFT HAND
LOCATION SIMPLE: RIGHT HAND
LOCATION SIMPLE: RIGHT LOWER BACK
LOCATION SIMPLE: RIGHT FOREARM
LOCATION SIMPLE: LEFT ANTERIOR NECK
LOCATION SIMPLE: LEFT UPPER BACK
LOCATION SIMPLE: LEFT FOREARM
LOCATION SIMPLE: UPPER BACK
LOCATION SIMPLE: RIGHT POSTERIOR UPPER ARM
LOCATION SIMPLE: LEFT THIGH
LOCATION SIMPLE: ABDOMEN

## 2019-08-16 ENCOUNTER — HOSPITAL ENCOUNTER (OUTPATIENT)
Dept: LAB | Facility: MEDICAL CENTER | Age: 64
End: 2019-08-16
Attending: NURSE PRACTITIONER
Payer: COMMERCIAL

## 2019-08-16 ENCOUNTER — HOSPITAL ENCOUNTER (OUTPATIENT)
Dept: RADIOLOGY | Facility: MEDICAL CENTER | Age: 64
End: 2019-08-16
Attending: NURSE PRACTITIONER
Payer: COMMERCIAL

## 2019-08-16 ENCOUNTER — OFFICE VISIT (OUTPATIENT)
Dept: URGENT CARE | Facility: PHYSICIAN GROUP | Age: 64
End: 2019-08-16
Payer: COMMERCIAL

## 2019-08-16 VITALS
TEMPERATURE: 98.2 F | DIASTOLIC BLOOD PRESSURE: 66 MMHG | SYSTOLIC BLOOD PRESSURE: 98 MMHG | HEART RATE: 74 BPM | HEIGHT: 65 IN | WEIGHT: 182 LBS | OXYGEN SATURATION: 98 % | BODY MASS INDEX: 30.32 KG/M2

## 2019-08-16 DIAGNOSIS — R11.0 NAUSEA: ICD-10-CM

## 2019-08-16 DIAGNOSIS — R11.2 NON-INTRACTABLE VOMITING WITH NAUSEA, UNSPECIFIED VOMITING TYPE: ICD-10-CM

## 2019-08-16 DIAGNOSIS — K52.9 COLITIS, ACUTE: ICD-10-CM

## 2019-08-16 DIAGNOSIS — E78.5 DYSLIPIDEMIA: ICD-10-CM

## 2019-08-16 DIAGNOSIS — R10.30 LOWER ABDOMINAL PAIN: ICD-10-CM

## 2019-08-16 DIAGNOSIS — E03.9 HYPOTHYROIDISM, UNSPECIFIED TYPE: ICD-10-CM

## 2019-08-16 LAB
ALBUMIN SERPL BCP-MCNC: 4.2 G/DL (ref 3.2–4.9)
ALBUMIN SERPL BCP-MCNC: 4.3 G/DL (ref 3.2–4.9)
ALBUMIN/GLOB SERPL: 1.3 G/DL
ALBUMIN/GLOB SERPL: 1.3 G/DL
ALP SERPL-CCNC: 65 U/L (ref 30–99)
ALP SERPL-CCNC: 71 U/L (ref 30–99)
ALT SERPL-CCNC: 15 U/L (ref 2–50)
ALT SERPL-CCNC: 15 U/L (ref 2–50)
ANION GAP SERPL CALC-SCNC: 11 MMOL/L (ref 0–11.9)
ANION GAP SERPL CALC-SCNC: 12 MMOL/L (ref 0–11.9)
AST SERPL-CCNC: 18 U/L (ref 12–45)
AST SERPL-CCNC: 20 U/L (ref 12–45)
BASOPHILS # BLD AUTO: 0.3 % (ref 0–1.8)
BASOPHILS # BLD: 0.06 K/UL (ref 0–0.12)
BILIRUB SERPL-MCNC: 0.7 MG/DL (ref 0.1–1.5)
BILIRUB SERPL-MCNC: 0.7 MG/DL (ref 0.1–1.5)
BUN SERPL-MCNC: 7 MG/DL (ref 8–22)
BUN SERPL-MCNC: 7 MG/DL (ref 8–22)
CALCIUM SERPL-MCNC: 9.3 MG/DL (ref 8.5–10.5)
CALCIUM SERPL-MCNC: 9.6 MG/DL (ref 8.5–10.5)
CHLORIDE SERPL-SCNC: 101 MMOL/L (ref 96–112)
CHLORIDE SERPL-SCNC: 102 MMOL/L (ref 96–112)
CHOLEST SERPL-MCNC: 203 MG/DL (ref 100–199)
CO2 SERPL-SCNC: 25 MMOL/L (ref 20–33)
CO2 SERPL-SCNC: 25 MMOL/L (ref 20–33)
CREAT SERPL-MCNC: 0.82 MG/DL (ref 0.5–1.4)
CREAT SERPL-MCNC: 0.86 MG/DL (ref 0.5–1.4)
EOSINOPHIL # BLD AUTO: 0.01 K/UL (ref 0–0.51)
EOSINOPHIL NFR BLD: 0.1 % (ref 0–6.9)
ERYTHROCYTE [DISTWIDTH] IN BLOOD BY AUTOMATED COUNT: 39.2 FL (ref 35.9–50)
ERYTHROCYTE [DISTWIDTH] IN BLOOD BY AUTOMATED COUNT: 41.9 FL (ref 35.9–50)
FASTING STATUS PATIENT QL REPORTED: NORMAL
GLOBULIN SER CALC-MCNC: 3.2 G/DL (ref 1.9–3.5)
GLOBULIN SER CALC-MCNC: 3.3 G/DL (ref 1.9–3.5)
GLUCOSE SERPL-MCNC: 116 MG/DL (ref 65–99)
GLUCOSE SERPL-MCNC: 124 MG/DL (ref 65–99)
HCT VFR BLD AUTO: 45 % (ref 37–47)
HCT VFR BLD AUTO: 47.3 % (ref 37–47)
HDLC SERPL-MCNC: 55 MG/DL
HGB BLD-MCNC: 15.6 G/DL (ref 12–16)
HGB BLD-MCNC: 15.7 G/DL (ref 12–16)
IMM GRANULOCYTES # BLD AUTO: 0.09 K/UL (ref 0–0.11)
IMM GRANULOCYTES NFR BLD AUTO: 0.5 % (ref 0–0.9)
LDLC SERPL CALC-MCNC: 131 MG/DL
LYMPHOCYTES # BLD AUTO: 0.9 K/UL (ref 1–4.8)
LYMPHOCYTES NFR BLD: 4.9 % (ref 22–41)
MCH RBC QN AUTO: 29.1 PG (ref 27–33)
MCH RBC QN AUTO: 29.7 PG (ref 27–33)
MCHC RBC AUTO-ENTMCNC: 33.2 G/DL (ref 33.6–35)
MCHC RBC AUTO-ENTMCNC: 34.7 G/DL (ref 33.6–35)
MCV RBC AUTO: 85.7 FL (ref 81.4–97.8)
MCV RBC AUTO: 87.6 FL (ref 81.4–97.8)
MONOCYTES # BLD AUTO: 1.11 K/UL (ref 0–0.85)
MONOCYTES NFR BLD AUTO: 6.1 % (ref 0–13.4)
NEUTROPHILS # BLD AUTO: 16.08 K/UL (ref 2–7.15)
NEUTROPHILS NFR BLD: 88.1 % (ref 44–72)
NRBC # BLD AUTO: 0 K/UL
NRBC BLD-RTO: 0 /100 WBC
PLATELET # BLD AUTO: 227 K/UL (ref 164–446)
PLATELET # BLD AUTO: 241 K/UL (ref 164–446)
PMV BLD AUTO: 11.3 FL (ref 9–12.9)
PMV BLD AUTO: 11.5 FL (ref 9–12.9)
POTASSIUM SERPL-SCNC: 3.1 MMOL/L (ref 3.6–5.5)
POTASSIUM SERPL-SCNC: 3.1 MMOL/L (ref 3.6–5.5)
PROT SERPL-MCNC: 7.5 G/DL (ref 6–8.2)
PROT SERPL-MCNC: 7.5 G/DL (ref 6–8.2)
RBC # BLD AUTO: 5.25 M/UL (ref 4.2–5.4)
RBC # BLD AUTO: 5.4 M/UL (ref 4.2–5.4)
SODIUM SERPL-SCNC: 137 MMOL/L (ref 135–145)
SODIUM SERPL-SCNC: 139 MMOL/L (ref 135–145)
T4 FREE SERPL-MCNC: 1.84 NG/DL (ref 0.53–1.43)
TRIGL SERPL-MCNC: 83 MG/DL (ref 0–149)
TSH SERPL DL<=0.005 MIU/L-ACNC: 0.02 UIU/ML (ref 0.38–5.33)
WBC # BLD AUTO: 18.3 K/UL (ref 4.8–10.8)
WBC # BLD AUTO: 19 K/UL (ref 4.8–10.8)

## 2019-08-16 PROCEDURE — 74177 CT ABD & PELVIS W/CONTRAST: CPT

## 2019-08-16 PROCEDURE — 80053 COMPREHEN METABOLIC PANEL: CPT | Mod: 91

## 2019-08-16 PROCEDURE — 700117 HCHG RX CONTRAST REV CODE 255: Performed by: NURSE PRACTITIONER

## 2019-08-16 PROCEDURE — 80061 LIPID PANEL: CPT

## 2019-08-16 PROCEDURE — 85027 COMPLETE CBC AUTOMATED: CPT

## 2019-08-16 PROCEDURE — 36415 COLL VENOUS BLD VENIPUNCTURE: CPT

## 2019-08-16 PROCEDURE — 80053 COMPREHEN METABOLIC PANEL: CPT

## 2019-08-16 PROCEDURE — 99214 OFFICE O/P EST MOD 30 MIN: CPT | Performed by: NURSE PRACTITIONER

## 2019-08-16 PROCEDURE — 84443 ASSAY THYROID STIM HORMONE: CPT

## 2019-08-16 PROCEDURE — 84439 ASSAY OF FREE THYROXINE: CPT

## 2019-08-16 PROCEDURE — 85025 COMPLETE CBC W/AUTO DIFF WBC: CPT

## 2019-08-16 RX ORDER — CIPROFLOXACIN 500 MG/1
500 TABLET, FILM COATED ORAL 2 TIMES DAILY
Qty: 6 TAB | Refills: 0 | Status: SHIPPED | OUTPATIENT
Start: 2019-08-16 | End: 2019-08-19

## 2019-08-16 RX ORDER — ONDANSETRON 4 MG/1
4 TABLET, ORALLY DISINTEGRATING ORAL EVERY 6 HOURS PRN
Qty: 10 TAB | Refills: 0 | Status: SHIPPED
Start: 2019-08-16 | End: 2020-04-29

## 2019-08-16 RX ADMIN — IOHEXOL 100 ML: 350 INJECTION, SOLUTION INTRAVENOUS at 16:30

## 2019-08-16 RX ADMIN — IOHEXOL 25 ML: 240 INJECTION, SOLUTION INTRATHECAL; INTRAVASCULAR; INTRAVENOUS; ORAL at 16:30

## 2019-08-16 ASSESSMENT — ENCOUNTER SYMPTOMS
CHILLS: 0
PALPITATIONS: 0
NAUSEA: 1
ORTHOPNEA: 0
FLANK PAIN: 0
VOMITING: 0
SHORTNESS OF BREATH: 0
DIZZINESS: 0
WEAKNESS: 0
DIARRHEA: 1
CONSTIPATION: 0
BACK PAIN: 0
FEVER: 0
HEADACHES: 0
MYALGIAS: 0
BLOOD IN STOOL: 0
ABDOMINAL PAIN: 1

## 2019-08-16 NOTE — PROGRESS NOTES
Subjective:      Acacia Arboleda is a 64 y.o. female who presents with Diverticulitis (Flare up, abdominal pain, abnormal BMS, not eating solid foods)            HPI  C/o low abdominal pain x 9 days. Nausea, decreased appetite. Diarrhea x 2 days. Has been drinking Ensure (regular and now clear kind) x 9 days. H/o IBS. H/o diverticulosis. Has mucoid diarrhea at this time. States under a lot of stress due to  in hospital. Denies fever. Has not been taking her thyroid med due to nausea. Takes no other Rx or OTC med. States has been keeping water down but then will get diarrhea with this.     PMH:  has a past medical history of Allergic rhinitis, Arthritis, Asthma, Bowel habit changes, Cancer ([facility]) (1984), Cataract, CHRONIC SINUSITIS (9/11/2009), Diabetes ([facility]), Dyslipidemia (9/11/2009), GERD (gastroesophageal reflux disease) (9/11/2009), Hemorrhoids, Herpes (9/11/2009), Hiatus hernia syndrome, Hypothyroid (9/11/2009), IBS (irritable bowel syndrome) (9/11/2009), Pain, Pneumonia (1986), Sleep apnea, Snoring (11/19/2013), and Urinary bladder disorder.  MEDS:   Current Outpatient Medications:   •  levothyroxine (SYNTHROID) 175 MCG Tab, TAKE 1 TABLET BY MOUTH  DAILY, Disp: 90 Tab, Rfl: 0  •  CALCIUM PO, Take 1,200 mg by mouth every day., Disp: , Rfl:   •  BIOTIN PO, Take  by mouth every day., Disp: , Rfl:   •  Propylene Glycol (SYSTANE BALANCE) 0.6 % Solution, by Ophthalmic route 2 Times a Day., Disp: , Rfl:   •  Melatonin 5 MG Cap, Take  by mouth., Disp: , Rfl:   •  loratadine (CLARITIN) 10 MG TABS, Take 10 mg by mouth every day., Disp: , Rfl:   •  VITAMIN E, by Does not apply route., Disp: , Rfl:   •  mometasone (NASONEX) 50 MCG/ACT nasal spray, Spray 2 Sprays in nose every day. Each nostril, Disp: 1 Inhaler, Rfl: 2  ALLERGIES:   Allergies   Allergen Reactions   • Sulfa Drugs Shortness of Breath, Rash and Swelling     body     SURGHX:   Past Surgical History:   Procedure Laterality Date   • ANTERIOR AND  "POSTERIOR REPAIR  12/12/2018    Procedure: ANTERIOR AND POSTERIOR REPAIR - COLPORRHAPHY;  Surgeon: Pierce Murreita M.D.;  Location: SURGERY SAME DAY [facility];  Service: Gynecology   • VAGINAL SUSPENSION  12/12/2018    Procedure: VAGINAL SUSPENSION - UTEROSACRAL LIGAMENT;  Surgeon: Pierce Murrieta M.D.;  Location: SURGERY SAME DAY [facility];  Service: Gynecology   • CHOLECYSTECTOMY  1995   • GYN SURGERY  1985    hysterectomy   • GYN SURGERY  1981    fertility surgery?   • GYN SURGERY  1979    tubal reconstruction   • APPENDECTOMY     • GYN SURGERY  1977,1978,1980    tubal pregnancy   • OTHER      RECTAL SPINCTER,2001;ASHLEY,1985;TUBAL PREGNANCY 1979&77;CRYOSURGERY 1983     SOCHX:  reports that she quit smoking about 32 years ago. Her smoking use included cigarettes. She has a 60.00 pack-year smoking history. She has never used smokeless tobacco. She reports that she does not drink alcohol or use drugs.  FH: Family history was reviewed, no pertinent findings to report    Review of Systems   Constitutional: Positive for malaise/fatigue. Negative for chills and fever.   Respiratory: Negative for shortness of breath.    Cardiovascular: Negative for chest pain, palpitations and orthopnea.   Gastrointestinal: Positive for abdominal pain, diarrhea and nausea. Negative for blood in stool, constipation, melena and vomiting.   Genitourinary: Negative for dysuria, flank pain, frequency, hematuria and urgency.   Musculoskeletal: Negative for back pain and myalgias.   Neurological: Negative for dizziness, weakness and headaches.   All other systems reviewed and are negative.         Objective:     BP (!) 98/66 (BP Location: Right arm, Patient Position: Sitting, BP Cuff Size: Adult)   Pulse 74   Temp 36.8 °C (98.2 °F) (Temporal)   Ht 1.651 m (5' 5\")   Wt 82.6 kg (182 lb)   LMP 03/01/1985   SpO2 98%   BMI 30.29 kg/m²      Physical Exam   Constitutional: She is oriented to person, place, and time. She appears " well-developed and well-nourished. She is active and cooperative.  Non-toxic appearance. She does not have a sickly appearance. She does not appear ill. No distress.   Appears fatigued.   Eyes: Pupils are equal, round, and reactive to light. Conjunctivae and EOM are normal.   Neck: Normal range of motion. Neck supple.   Cardiovascular: Normal rate, regular rhythm and normal heart sounds.   Pulmonary/Chest: Effort normal and breath sounds normal. No accessory muscle usage. No respiratory distress.   Abdominal: Soft. Bowel sounds are normal. She exhibits no distension. There is no hepatosplenomegaly. There is tenderness in the periumbilical area. There is no rigidity, no rebound, no guarding, no tenderness at McBurney's point and negative Manzano's sign. No hernia.       Musculoskeletal: Normal range of motion.   Neurological: She is alert and oriented to person, place, and time. She has normal strength. No cranial nerve deficit or sensory deficit. Coordination and gait normal. GCS eye subscore is 4. GCS verbal subscore is 5. GCS motor subscore is 6.   Skin: Skin is warm and dry. She is not diaphoretic.   Psychiatric: She has a normal mood and affect. Her speech is normal and behavior is normal. Judgment and thought content normal. She is not actively hallucinating. Cognition and memory are normal. She is attentive.   Vitals reviewed.       CT abd:  1.  Diffuse pancolonic low-density wall thickening with surrounding inflammatory changes. No abscess or pneumoperitoneum is identified. Findings are consistent with infectious or inflammatory colitis.  2.  Mild diverticular disease in the sigmoid colon.     Assessment/Plan:     1. Lower abdominal pain    - CBC WITH DIFFERENTIAL; Future  - CT-ABDOMEN-PELVIS WITH; Future  - Comp Metabolic Panel; Future    2. Non-intractable vomiting with nausea, unspecified vomiting type    - CBC WITH DIFFERENTIAL; Future  - CT-ABDOMEN-PELVIS WITH; Future  - Comp Metabolic Panel; Future    3.  Nausea    - ondansetron (ZOFRAN ODT) 4 MG TABLET DISPERSIBLE; Take 1 Tab by mouth every 6 hours as needed.  Dispense: 10 Tab; Refill: 0    4. Colitis, acute    - ciprofloxacin (CIPRO) 500 MG Tab; Take 1 Tab by mouth 2 times a day for 3 days.  Dispense: 6 Tab; Refill: 0    -Reviewed lab results and CT scan. Potassium is 3.1 due to excessive diarrhea. Monitor for CP/pressure, palpitations- must go to ER immediately due to low potassium.  -Patient opts to be treated outpatient with abx and zofran at this time.  -Recommend ER visit if symptoms worsen in next 12-24 hrs, enforced not to wait until severe symptoms, patient expresses understanding.  -Spoke to Dr. Trujillo regarding patient status and decision to treat.    Maintain water status slowly with sips of water and electrolyte fluid, increase to larger amounts as tolerated  Introduce solid foods when diarrhea ceases and becomes firmer without abdominal cramping. Eat items from the BRAT diet- trying small amount with one item at a time  May use Lanolin, diaper rash ointment or Vaseline to soothe anus for raw skin

## 2019-08-21 ENCOUNTER — OFFICE VISIT (OUTPATIENT)
Dept: MEDICAL GROUP | Facility: PHYSICIAN GROUP | Age: 64
End: 2019-08-21
Payer: COMMERCIAL

## 2019-08-21 VITALS
HEART RATE: 68 BPM | HEIGHT: 65 IN | RESPIRATION RATE: 16 BRPM | SYSTOLIC BLOOD PRESSURE: 110 MMHG | TEMPERATURE: 97.6 F | OXYGEN SATURATION: 97 % | DIASTOLIC BLOOD PRESSURE: 70 MMHG | WEIGHT: 180 LBS | BODY MASS INDEX: 29.99 KG/M2

## 2019-08-21 DIAGNOSIS — K57.32 DIVERTICULITIS OF LARGE INTESTINE WITHOUT PERFORATION OR ABSCESS WITHOUT BLEEDING: ICD-10-CM

## 2019-08-21 DIAGNOSIS — E03.9 HYPOTHYROIDISM, UNSPECIFIED TYPE: ICD-10-CM

## 2019-08-21 DIAGNOSIS — Z00.00 ENCOUNTER FOR WELLNESS EXAMINATION: ICD-10-CM

## 2019-08-21 PROBLEM — Z76.89 ENCOUNTER TO ESTABLISH CARE WITH NEW DOCTOR: Status: RESOLVED | Noted: 2018-06-21 | Resolved: 2019-08-21

## 2019-08-21 PROBLEM — R19.7 DIARRHEA: Status: RESOLVED | Noted: 2017-12-29 | Resolved: 2019-08-21

## 2019-08-21 PROCEDURE — 99396 PREV VISIT EST AGE 40-64: CPT | Performed by: NURSE PRACTITIONER

## 2019-08-21 RX ORDER — LEVOTHYROXINE SODIUM 0.15 MG/1
TABLET ORAL
Qty: 90 TAB | Refills: 3 | Status: SHIPPED | OUTPATIENT
Start: 2019-08-21 | End: 2020-07-01

## 2019-08-21 ASSESSMENT — PATIENT HEALTH QUESTIONNAIRE - PHQ9: CLINICAL INTERPRETATION OF PHQ2 SCORE: 0

## 2019-08-21 NOTE — ASSESSMENT & PLAN NOTE
Reviewed recent labs.  TSH is low.  Discussed plan to decrease levothyroxine to 150 mcg and recheck labs in 3 months.

## 2019-08-21 NOTE — ASSESSMENT & PLAN NOTE
Recently finished antibiotics and is slowly starting to feel better.  She is still having diarrhea, but she is also still on a clear liquid diet.  She will slowly start advancing her diet as tolerated.

## 2019-08-21 NOTE — PROGRESS NOTES
Chief Complaint   Patient presents with   • Annual Exam   • Results     Review Labs    • Hypothyroidism     Medication Refill        HISTORY OF PRESENT ILLNESS: Patient is a 64 y.o. female established patient who presents today to discuss the following issues:    Diverticulitis of large intestine without perforation or abscess without bleeding  Recently finished antibiotics and is slowly starting to feel better.  She is still having diarrhea, but she is also still on a clear liquid diet.  She will slowly start advancing her diet as tolerated.     Hypothyroid  Reviewed recent labs.  TSH is low.  Discussed plan to decrease levothyroxine to 150 mcg and recheck labs in 3 months.     Encounter for wellness examination  Is here for her annual wellness visit.  Reviewed recent lab results.      Patient Active Problem List    Diagnosis Date Noted   • Encounter for wellness examination 08/21/2019   • Vaginal prolapse 06/25/2018   • Hemorrhoids 12/29/2017   • Diverticulitis of large intestine without perforation or abscess without bleeding 12/19/2017   • Obesity (BMI 30-39.9) 11/16/2017   • Insomnia 05/16/2016   • Obstructive sleep apnea 04/13/2016   • Snoring 11/19/2013   • Dyslipidemia 09/11/2009   • Hypothyroid 09/11/2009   • IBS (irritable bowel syndrome) 09/11/2009   • GERD (gastroesophageal reflux disease) 09/11/2009   • Herpes 09/11/2009   • CHRONIC SINUSITIS 09/11/2009       Allergies:Sulfa drugs    Current Outpatient Medications   Medication Sig Dispense Refill   • levothyroxine (SYNTHROID) 150 MCG Tab TAKE 1 TABLET BY MOUTH DAILY 90 Tab 3   • ondansetron (ZOFRAN ODT) 4 MG TABLET DISPERSIBLE Take 1 Tab by mouth every 6 hours as needed. 10 Tab 0   • loratadine (CLARITIN) 10 MG TABS Take 10 mg by mouth every day.       No current facility-administered medications for this visit.        Social History     Tobacco Use   • Smoking status: Former Smoker     Packs/day: 3.00     Years: 20.00     Pack years: 60.00     Types:  "Cigarettes     Last attempt to quit: 1986     Years since quittin.7   • Smokeless tobacco: Never Used   • Tobacco comment: QUIT    Substance Use Topics   • Alcohol use: No     Alcohol/week: 0.0 oz   • Drug use: No       Family Status   Relation Name Status   • Fa  Alive   • Mo     • MAunt  (Not Specified)   • MUnc  (Not Specified)     Family History   Problem Relation Age of Onset   • Heart Disease Father         AAA   • Cancer Mother         UTERINE   • Heart Disease Mother         AFIB   • Cancer Maternal Aunt         BREAST   • Cancer Maternal Uncle         COLON       Review of Systems:   Constitutional: Negative for fever, chills, weight loss and malaise/fatigue.   HENT: Negative for ear pain, nosebleeds, congestion, sore throat and neck pain.    Eyes: Negative for blurred vision.   Respiratory: Negative for cough, sputum production, shortness of breath and wheezing.    Cardiovascular: Negative for chest pain, palpitations, orthopnea and leg swelling.   Gastrointestinal: Negative for heartburn, nausea, vomiting and abdominal pain.  Positive for diarrhea.  Genitourinary: Negative for dysuria, urgency and frequency.   Musculoskeletal: Negative for myalgias, joint pain, and back pain.  Skin: Negative for rash and itching.   Neurological: Negative for dizziness, tingling, tremors, sensory change, focal weakness and headaches.   Endo/Heme/Allergies: Does not bruise/bleed easily.   Psychiatric/Behavioral: Negative for depression, suicidal ideas and memory loss.  The patient is not nervous/anxious and does not have insomnia.    All other systems reviewed and are negative except as in HPI.    Exam:  /70   Pulse 68   Temp 36.4 °C (97.6 °F)   Resp 16   Ht 1.651 m (5' 5\")   Wt 81.6 kg (180 lb)   SpO2 97%   General:  Well nourished, well developed female in NAD  Head: Grossly normal.  Neck: Supple without JVD or bruit. Thyroid is not enlarged.  Pulmonary: Clear to ausculation. Normal " effort. No rales, ronchi, or wheezing.  Cardiovascular: Regular rate and rhythm without murmur.   Abdomen:  Soft, nontender, nondistended.  Extremities: No clubbing, cyanosis, or edema.  Skin: Intact with no obvious rashes or lesions.  Neuro: Grossly intact.  Psych: Alert and oriented x 3.  Mood and affect appropriate.    Medical decision-making and discussion: Acacia is here today to discuss a few things.  Her records and results were reviewed, lab work was ordered to be done again in 3 months, and levothyroxine was sent to her pharmacy.  She will follow-up here as needed.          Assessment/Plan:  1. Hypothyroidism, unspecified type  levothyroxine (SYNTHROID) 150 MCG Tab    TSH    TRIIDOTHYRONINE    FREE THYROXINE   2. Diverticulitis of large intestine without perforation or abscess without bleeding     3. Encounter for wellness examination         Return if symptoms worsen or fail to improve.    Please note that this dictation was created using voice recognition software. I have made every reasonable attempt to correct obvious errors, but I expect that there are errors of grammar and possibly content that I did not discover before finalizing the note.

## 2019-10-15 ENCOUNTER — OFFICE VISIT (OUTPATIENT)
Dept: PULMONOLOGY | Facility: HOSPICE | Age: 64
End: 2019-10-15
Payer: COMMERCIAL

## 2019-10-15 VITALS
RESPIRATION RATE: 16 BRPM | DIASTOLIC BLOOD PRESSURE: 62 MMHG | WEIGHT: 173 LBS | SYSTOLIC BLOOD PRESSURE: 96 MMHG | HEART RATE: 73 BPM | OXYGEN SATURATION: 98 % | BODY MASS INDEX: 28.82 KG/M2 | HEIGHT: 65 IN

## 2019-10-15 DIAGNOSIS — K21.9 GASTROESOPHAGEAL REFLUX DISEASE, ESOPHAGITIS PRESENCE NOT SPECIFIED: ICD-10-CM

## 2019-10-15 DIAGNOSIS — G47.33 OBSTRUCTIVE SLEEP APNEA: Chronic | ICD-10-CM

## 2019-10-15 DIAGNOSIS — F51.04 PSYCHOPHYSIOLOGICAL INSOMNIA: ICD-10-CM

## 2019-10-15 DIAGNOSIS — Z87.891 FORMER SMOKER: ICD-10-CM

## 2019-10-15 PROCEDURE — 99214 OFFICE O/P EST MOD 30 MIN: CPT | Performed by: NURSE PRACTITIONER

## 2019-10-15 RX ORDER — CHOLECALCIFEROL (VITAMIN D3) 125 MCG
5 CAPSULE ORAL
COMMUNITY

## 2019-10-15 NOTE — PROGRESS NOTES
Chief Complaint   Patient presents with   • Apnea      BIPAP 16/14cm H20       HPI:  Acacia Arboleda is a 64 y.o. year old female here today for follow-up on JAYSHREE.    Prior polysomnogram 1/27/15 indicated severe sleep apnea with AHI of 85.    She is currently using BiPAP 16/14cm H2O nightly.    Compliance card 9/15/2019 through 10/14/2019 indicates 100% compliance, average nightly use of 6 hours, significant mask leak of 1 hour with an overall AHI of 3.4/h.  I reviewed findings with patient.  She tolerates mask and pressure but does note leaking of mask.  She continues to valadez with insomnia.  She does feel more rested when she gets longer nights of rest on her device.  She goes to bed at 11 PM but wakes chronically between 3 and 4 AM and then just dozes until she wakes up at 7 AM.  She denies headaches using device.    She does have a history of insomnia. She has been seen by Dr. roque for CBT therapy with benefit.  She has successfully weaned off Lunesta and uses melatonin nightly.  She has tried Ambien and Belsomra in the past without subjective benefit.  She is currently under quite a bit of stress due to selling a house and also building a new house.  This affects her sleep quite a bit.  BMI 28.  She has lost 10 pounds since last office visit.  She would like to lose another 10 pounds.  That will be her goal weight.  In all she is lost 62 pounds since her last study January 2015 and should undergo updated diagnostic study due to being closer to goal weight at this point.  She would like to check cost first.  She denies cardiac or respiratory symptoms.    ROS: As per HPI and otherwise negative if not stated.    Past Medical History:   Diagnosis Date   • Allergic rhinitis    • Arthritis    • Asthma    • Bowel habit changes     IBS   • Cancer ([facility]) 1984    uterine   • Cataract     no surgery yet   • CHRONIC SINUSITIS 9/11/2009   • Diabetes ([facility])     diet controlled   • Dyslipidemia 9/11/2009   • GERD  (gastroesophageal reflux disease) 9/11/2009   • Hemorrhoids    • Herpes 9/11/2009   • Hiatus hernia syndrome    • Hypothyroid 9/11/2009   • IBS (irritable bowel syndrome) 9/11/2009   • Pain     hands knees, hips   • Pneumonia 1986   • Sleep apnea     bipap   • Snoring 11/19/2013   • Urinary bladder disorder     prolapse       Past Surgical History:   Procedure Laterality Date   • ANTERIOR AND POSTERIOR REPAIR  12/12/2018    Procedure: ANTERIOR AND POSTERIOR REPAIR - COLPORRHAPHY;  Surgeon: Pierce Murrieta M.D.;  Location: SURGERY SAME DAY [facility];  Service: Gynecology   • VAGINAL SUSPENSION  12/12/2018    Procedure: VAGINAL SUSPENSION - UTEROSACRAL LIGAMENT;  Surgeon: Pierce Murrieta M.D.;  Location: SURGERY SAME DAY [facility];  Service: Gynecology   • CHOLECYSTECTOMY  1995   • GYN SURGERY  1985    hysterectomy   • GYN SURGERY  1981    fertility surgery?   • GYN SURGERY  1979    tubal reconstruction   • APPENDECTOMY     • GYN SURGERY  1977,1978,1980    tubal pregnancy   • OTHER      RECTAL SPINCTER,2001;ASHLEY,1985;TUBAL PREGNANCY 1979&77;CRYOSURGERY 1983       Family History   Problem Relation Age of Onset   • Heart Disease Father         AAA   • Cancer Mother         UTERINE   • Heart Disease Mother         AFIB   • Cancer Maternal Aunt         BREAST   • Cancer Maternal Uncle         COLON       Social History     Socioeconomic History   • Marital status:      Spouse name: Not on file   • Number of children: Not on file   • Years of education: Not on file   • Highest education level: Not on file   Occupational History   • Not on file   Social Needs   • Financial resource strain: Not on file   • Food insecurity:     Worry: Not on file     Inability: Not on file   • Transportation needs:     Medical: Not on file     Non-medical: Not on file   Tobacco Use   • Smoking status: Former Smoker     Packs/day: 3.00     Years: 20.00     Pack years: 60.00     Types: Cigarettes     Last attempt to quit:  "1986     Years since quittin.8   • Smokeless tobacco: Never Used   • Tobacco comment: QUIT    Substance and Sexual Activity   • Alcohol use: No     Alcohol/week: 0.0 oz   • Drug use: No   • Sexual activity: Never   Lifestyle   • Physical activity:     Days per week: Not on file     Minutes per session: Not on file   • Stress: Not on file   Relationships   • Social connections:     Talks on phone: Not on file     Gets together: Not on file     Attends Zoroastrian service: Not on file     Active member of club or organization: Not on file     Attends meetings of clubs or organizations: Not on file     Relationship status: Not on file   • Intimate partner violence:     Fear of current or ex partner: Not on file     Emotionally abused: Not on file     Physically abused: Not on file     Forced sexual activity: Not on file   Other Topics Concern   • Not on file   Social History Narrative   • Not on file       Allergies as of 10/15/2019 - Reviewed 10/15/2019   Allergen Reaction Noted   • Sulfa drugs Shortness of Breath, Rash, and Swelling 2009        Vitals:  BP (!) 96/62   Pulse 73   Resp 16   Ht 1.651 m (5' 5\")   Wt 78.5 kg (173 lb)   SpO2 98%     Current medications as of today   Current Outpatient Medications   Medication Sig Dispense Refill   • Melatonin 5 MG Tab Take 5 mg by mouth.     • levothyroxine (SYNTHROID) 150 MCG Tab TAKE 1 TABLET BY MOUTH DAILY 90 Tab 3   • ondansetron (ZOFRAN ODT) 4 MG TABLET DISPERSIBLE Take 1 Tab by mouth every 6 hours as needed. 10 Tab 0   • loratadine (CLARITIN) 10 MG TABS Take 10 mg by mouth every day.       No current facility-administered medications for this visit.          Physical Exam:   Gen:           Alert and oriented, No apparent distress. Mood and affect appropriate, normal interaction with examiner.  Eyes:          PERRL, EOM intact, sclere white, conjunctive moist. Glasses.  Ears:          Not examined.   Hearing:     Grossly intact.  Nose:        "   Normal, no lesions or deformities.  Dentition:    Good dentition.  Oropharynx:   Tongue normal, posterior pharynx without erythema or exudate.  Mallampati Classification: 2/3  Neck:        Supple, trachea midline, no masses.  Respiratory Effort: No intercostal retractions or use of accessory muscles.   Lung Auscultation:      Clear to auscultation bilaterally; no rales, rhonchi or wheezing.  CV:            Regular rate and rhythm. No murmurs, rubs or gallops.  Abd:           Not examined.   Lymphadenopathy: Not examined.  Gait and Station: Normal.  Digits and Nails: No clubbing, cyanosis, petechiae, or nodes.   Cranial Nerves: II-XII grossly intact.  Skin:        No rashes, lesions or ulcers noted.               Ext:           No cyanosis or edema.      Assessment:  1. Obstructive sleep apnea     2. Psychophysiological insomnia     3. Gastroesophageal reflux disease, esophagitis presence not specified     4. Former smoker     5. BMI 28.0-28.9,adult         Immunizations:    Flu:recommend obtaining  Pneumovax 23:2018  Prevnar 13:2017    Plan:  1.  JAYSHREE is clinically stable but patient would benefit from an updated diagnostic study due to significant weight loss.  She is also having significant mask leaking and may have better tolerance with a lower pressure.  She notes the noise of the device to create a motion that almost makes her sick at times.  DME order to decrease pressures to 14/10 cm nightly.  DME mask/supplies.  PSG split-night to reevaluate therapy due to weight loss.  2.  Discussed sleep hygiene.    3.  Continue melatonin nightly for insomnia issues.  4.  Follow-up in 6 months with compliance report, sooner if needed.  Patient will contact office if she notes pressure adjustment to be intolerable.    Please note that this dictation was created using voice recognition software. I have made every reasonable attempt to correct obvious errors, but it is possible there are errors of grammar and possibly  content that I did not discover before finalizing the note.

## 2019-11-06 ENCOUNTER — SLEEP STUDY (OUTPATIENT)
Dept: SLEEP MEDICINE | Facility: MEDICAL CENTER | Age: 64
End: 2019-11-06
Attending: NURSE PRACTITIONER
Payer: COMMERCIAL

## 2019-11-06 DIAGNOSIS — G47.33 OBSTRUCTIVE SLEEP APNEA: Chronic | ICD-10-CM

## 2019-11-06 PROCEDURE — 95811 POLYSOM 6/>YRS CPAP 4/> PARM: CPT | Performed by: INTERNAL MEDICINE

## 2019-11-07 NOTE — PROCEDURES
Clinical Comments:    The patient underwent a split night polysomnogram with a CPAP titration using the standard montage for measurement of paramaters of sleep, respiratory events, movement abnormalities, heart rate and rhythm.  A Microphone was used to monitior snoring.           DIAGNOSTIC:  Analysis:  Study start time was 10:17:28 PM.  Total recording time was 3h 0.0m (180 minutes) with a total sleep time of 2h 5.0m (125 minutes) resulting in a sleep efficiency of 69.44%.  Sleep latency from the start fo the study was 35 minutes minutes and REM latency from sleep onset was 121 minutes minutes.  Respiratory:   There were 17 apneas in total consisting of 10 obstructive apneas, 0 mixed apneas, and 7 central apneas.  There were 38 hypopneas in total.  The apnea index was 8.16 per hour and the hypopnea index was 18.24 per hour.  The overall AHI was 26.4, with a REM AHI of 81.43, and a supine AHI of 64.74.  Limb Movements:  There were a total of 32 periodic leg movements, of which 6 were PLMS arousals.  This resulted in a PLMS index of 15.4 and a PLMS arousal index of 2.9  Oximetry:  The mean SaO2 was 93.0% for the diagnostic portion of the study, with a minimum SaO2 of 67.0%.               TREATMENT:  Analysis:  Treatment recording time was 4h 42.0m (282 minutes) with a total sleep time of 4h 10.0m (250 minutes) resulting in a sleep efficiency of 88.7%.    Sleep latency from the start of treatment was 13 minutes minutes and REM latency from sleep onset was 1h 31.5m minutes.    The patient had 63 arousals in total for an arousal index of 15.1.  Respiratory:   There were 6 apneas in total consisting of  6 obstructive apneas, 0 central apneas, and 0 mixed apneas for an apnea index of 1.44.    The patient had 13 hypopneas in total, which resulted in a hypopnea index of 3.12.    The overall AHI was 4.56, with a REM AHI of 7.79, and a supine AHI of 5.02.     Limb Movements:  There were a total of 8 periodic leg movements,  of which 4 were PLMS arousals.  This resulted in a PLMS index of 1.9 and a PLMS arousal index of 1.0.  Oximetry:  The mean SaO2 during treatment was 95.0%, with a minimum oxygen saturation of 87.0%.      Interpretation:    Ms. Arboleda has a history of severe sleep apnea hypopnea syndrome.  Polysomnography on January 27, 2015 demonstrated an apnea hypopnea index of 85 events per hour.  She has been treated with BiPAP at 16/14 cmH2O pressure.  She has lost about 62 pounds and this is a split-night study designed to reassess the severity of her sleep disordered breathing and to recalibrate therapy if necessary.    In the diagnostic phase there is fragmentation of sleep with increased wake after sleep onset time and prolonged sleep onset latency.  Nonetheless 14 minutes of REM sleep time are included.  There are periodic limb movements but the periodic limb movement with arousal index is only 3.8 events per hour.  The apnea hypopnea index is 26.4 events per hour, including hypopnea and obstructive apnea episodes with occasional central apneas as well.  The index rises to 64.7 events per hour during supine sleep.  The lowest arterial oxygen saturation is 67% on room air and she spends about 10% of the study time with a saturation below 90%.  The heart rate varies from 59 to 79 bpm.    In the treatment phase of the study there is improved continuity of sleep and 38 minutes of REM sleep time are included.  CPAP was applied at 4 to 10 cm water pressure.  The higher pressures were used to suppress persisting hypopnea events in supine and REM sleep.  In the final 2 stages of the titration the apnea hypopnea index was less than 1 event per hour with a mean arterial oxygen saturation of 95% and a minimum saturation of 88% on room air.  Those stages in the titration included 14 minutes of REM sleep time and significant time in the supine body position.    Assessment:  Moderate obstructive sleep apnea and hypopnea with an apnea  hypopnea index of 26.4 events per hour including a significant positional component.  The index has improved significantly compared to the previous polysomnogram done before weight loss.  Severe nocturnal hypoxemia with a lowest arterial oxygen saturation of 67% on room air.  Fragmentation of sleep related to the breathing events and improved on treatment.  There is an excellent response to CPAP therapy.    Recommendations:  CPAP at 10 cm water pressure.  She did best with a small air fit N30i nasal mask.

## 2019-11-15 DIAGNOSIS — G47.33 OSA (OBSTRUCTIVE SLEEP APNEA): ICD-10-CM

## 2019-12-02 ENCOUNTER — HOSPITAL ENCOUNTER (OUTPATIENT)
Dept: RADIOLOGY | Facility: MEDICAL CENTER | Age: 64
End: 2019-12-02
Attending: NURSE PRACTITIONER
Payer: COMMERCIAL

## 2019-12-02 DIAGNOSIS — Z12.31 SCREENING MAMMOGRAM, ENCOUNTER FOR: ICD-10-CM

## 2019-12-02 PROCEDURE — 77067 SCR MAMMO BI INCL CAD: CPT

## 2019-12-10 ENCOUNTER — HOSPITAL ENCOUNTER (OUTPATIENT)
Dept: RADIOLOGY | Facility: MEDICAL CENTER | Age: 64
End: 2019-12-10
Attending: NURSE PRACTITIONER
Payer: COMMERCIAL

## 2019-12-10 DIAGNOSIS — R92.8 ABNORMAL MAMMOGRAM: ICD-10-CM

## 2019-12-10 PROCEDURE — G0279 TOMOSYNTHESIS, MAMMO: HCPCS | Mod: LT

## 2020-01-27 ENCOUNTER — HOSPITAL ENCOUNTER (OUTPATIENT)
Dept: LAB | Facility: MEDICAL CENTER | Age: 65
End: 2020-01-27
Attending: NURSE PRACTITIONER
Payer: COMMERCIAL

## 2020-01-27 DIAGNOSIS — E03.9 HYPOTHYROIDISM, UNSPECIFIED TYPE: ICD-10-CM

## 2020-01-27 PROCEDURE — 36415 COLL VENOUS BLD VENIPUNCTURE: CPT

## 2020-01-27 PROCEDURE — 84480 ASSAY TRIIODOTHYRONINE (T3): CPT

## 2020-01-27 PROCEDURE — 84443 ASSAY THYROID STIM HORMONE: CPT

## 2020-01-27 PROCEDURE — 84439 ASSAY OF FREE THYROXINE: CPT

## 2020-01-28 LAB
T3 SERPL-MCNC: 128 NG/DL (ref 60–181)
T4 FREE SERPL-MCNC: 2.61 NG/DL (ref 0.53–1.43)
TSH SERPL DL<=0.005 MIU/L-ACNC: 0.04 UIU/ML (ref 0.38–5.33)

## 2020-03-30 ENCOUNTER — APPOINTMENT (OUTPATIENT)
Dept: SLEEP MEDICINE | Facility: MEDICAL CENTER | Age: 65
End: 2020-03-30
Payer: COMMERCIAL

## 2020-04-29 ENCOUNTER — OFFICE VISIT (OUTPATIENT)
Dept: MEDICAL GROUP | Facility: PHYSICIAN GROUP | Age: 65
End: 2020-04-29
Payer: COMMERCIAL

## 2020-04-29 VITALS
RESPIRATION RATE: 18 BRPM | WEIGHT: 200 LBS | SYSTOLIC BLOOD PRESSURE: 138 MMHG | HEART RATE: 71 BPM | DIASTOLIC BLOOD PRESSURE: 78 MMHG | OXYGEN SATURATION: 97 % | BODY MASS INDEX: 33.28 KG/M2 | TEMPERATURE: 97.7 F

## 2020-04-29 DIAGNOSIS — L02.91 ABSCESS: ICD-10-CM

## 2020-04-29 PROCEDURE — 99214 OFFICE O/P EST MOD 30 MIN: CPT | Performed by: NURSE PRACTITIONER

## 2020-04-29 RX ORDER — AMOXICILLIN AND CLAVULANATE POTASSIUM 875; 125 MG/1; MG/1
1 TABLET, FILM COATED ORAL 2 TIMES DAILY
Qty: 20 TAB | Refills: 0 | Status: SHIPPED | OUTPATIENT
Start: 2020-04-29 | End: 2020-08-31

## 2020-04-29 ASSESSMENT — FIBROSIS 4 INDEX: FIB4 SCORE: 1.23

## 2020-04-29 ASSESSMENT — PATIENT HEALTH QUESTIONNAIRE - PHQ9: CLINICAL INTERPRETATION OF PHQ2 SCORE: 0

## 2020-04-29 NOTE — PROGRESS NOTES
"Chief Complaint   Patient presents with   • Office Visit     \"infection on chest\" Per Pt.        HISTORY OF PRESENT ILLNESS: Patient is a 65 y.o. female established patient who presents today to discuss the following issues:    Abscess  Has had a small nodule under the surface of the skin on her right chest for years.  Her dermatologist said that she didn't know what it was, but injected it at one point and it went away.  A few weeks ago the patient noticed that the small lump had returned.  2 weeks ago it started to get red, and by last week it was very large, purple, painful, and indurated.  She called for an appointment and was told that she would have to wait a week.  She was encouraged to utilize urgent care or a virtual visit in the future.  The bump has since gotten smaller, but it is still red, indurated, and tender.  It has not drained as far as she is aware.  Discussed plan to proceed with antibiotics and hot compresses.  She will also schedule an appointment with her dermatologist.      Patient Active Problem List    Diagnosis Date Noted   • Abscess 04/29/2020   • Encounter for wellness examination 08/21/2019   • Vaginal prolapse 06/25/2018   • Hemorrhoids 12/29/2017   • Diverticulitis of large intestine without perforation or abscess without bleeding 12/19/2017   • Obesity (BMI 30-39.9) 11/16/2017   • Insomnia 05/16/2016   • Obstructive sleep apnea 04/13/2016   • Snoring 11/19/2013   • Dyslipidemia 09/11/2009   • Hypothyroid 09/11/2009   • IBS (irritable bowel syndrome) 09/11/2009   • GERD (gastroesophageal reflux disease) 09/11/2009   • Herpes 09/11/2009   • CHRONIC SINUSITIS 09/11/2009       Allergies:Sulfa drugs    Current Outpatient Medications   Medication Sig Dispense Refill   • amoxicillin-clavulanate (AUGMENTIN) 875-125 MG Tab Take 1 Tab by mouth 2 times a day. 20 Tab 0   • Melatonin 5 MG Tab Take 5 mg by mouth.     • levothyroxine (SYNTHROID) 150 MCG Tab TAKE 1 TABLET BY MOUTH DAILY 90 Tab 3   • " loratadine (CLARITIN) 10 MG TABS Take 10 mg by mouth every day.       No current facility-administered medications for this visit.        Social History     Tobacco Use   • Smoking status: Former Smoker     Packs/day: 3.00     Years: 20.00     Pack years: 60.00     Types: Cigarettes     Last attempt to quit: 1986     Years since quittin.4   • Smokeless tobacco: Never Used   • Tobacco comment: QUIT    Substance Use Topics   • Alcohol use: No     Alcohol/week: 0.0 oz   • Drug use: No       Family Status   Relation Name Status   • Fa  Alive   • Mo     • MAunt  (Not Specified)   • MUnc  (Not Specified)     Family History   Problem Relation Age of Onset   • Heart Disease Father         AAA   • Cancer Mother         UTERINE   • Heart Disease Mother         AFIB   • Cancer Maternal Aunt         BREAST   • Cancer Maternal Uncle         COLON       Review of Systems:   Constitutional: Negative for fever, chills, weight loss and malaise/fatigue.   HENT: Negative for ear pain, nosebleeds, congestion, sore throat and neck pain.    Eyes: Negative for blurred vision.   Respiratory: Negative for cough, sputum production, shortness of breath and wheezing.    Cardiovascular: Negative for chest pain, palpitations, orthopnea and leg swelling.   Gastrointestinal: Negative for heartburn, nausea, vomiting and abdominal pain.   Genitourinary: Negative for dysuria, urgency and frequency.   Musculoskeletal: Negative for myalgias, joint pain, and back pain.  Skin: Negative for rash and itching. Positive for infection on chest.  Neurological: Negative for dizziness, tingling, tremors, sensory change, focal weakness and headaches.   Endo/Heme/Allergies: Does not bruise/bleed easily.   Psychiatric/Behavioral: Negative for depression, suicidal ideas and memory loss.  The patient is not nervous/anxious and does not have insomnia.    All other systems reviewed and are negative except as in HPI.    Exam:  Blood Pressure 138/78  (BP Location: Right arm)   Pulse 71   Temperature 36.5 °C (97.7 °F)   Respiration 18   Weight 90.7 kg (200 lb)   Oxygen Saturation 97%   General:  Well nourished, well developed female in NAD  Head: Grossly normal.  Neck: Supple   Pulmonary: Normal effort.   Cardiovascular: Regular rate and rhythm without murmur.   Abdomen:  Soft, nondistended.  Extremities: No clubbing, cyanosis, or edema.  Skin: Intact with no obvious rashes. 3 cm lesion on right chest with erythema and induration.  Neuro: Grossly intact.  Psych: Alert and oriented x 3.  Mood and affect appropriate.    Medical decision-making and discussion: Acacia is here today to discuss symptoms.  Her chart was reviewed, antibiotics were sent to her pharmacy, she will schedule an appointment with her dermatologist, and she will follow-up here as needed.          Assessment/Plan:  1. Abscess  amoxicillin-clavulanate (AUGMENTIN) 875-125 MG Tab       Return if symptoms worsen or fail to improve.    Please note that this dictation was created using voice recognition software. I have made every reasonable attempt to correct obvious errors, but I expect that there are errors of grammar and possibly content that I did not discover before finalizing the note.

## 2020-04-29 NOTE — ASSESSMENT & PLAN NOTE
Has had a small nodule under the surface of the skin on her right chest for years.  Her dermatologist said that she didn't know what it was, but injected it at one point and it went away.  A few weeks ago the patient noticed that the small lump had returned.  2 weeks ago it started to get red, and by last week it was very large, purple, painful, and indurated.  She called for an appointment and was told that she would have to wait a week.  She was encouraged to utilize urgent care or a virtual visit in the future.  The bump has since gotten smaller, but it is still red, indurated, and tender.  It has not drained as far as she is aware.  Discussed plan to proceed with antibiotics and hot compresses.  She will also schedule an appointment with her dermatologist.

## 2020-05-01 ENCOUNTER — APPOINTMENT (RX ONLY)
Dept: URBAN - METROPOLITAN AREA CLINIC 4 | Facility: CLINIC | Age: 65
Setting detail: DERMATOLOGY
End: 2020-05-01

## 2020-05-01 DIAGNOSIS — L72.0 EPIDERMAL CYST: ICD-10-CM

## 2020-05-01 PROCEDURE — ? ADDITIONAL NOTES

## 2020-05-01 PROCEDURE — ? INTRALESIONAL KENALOG

## 2020-05-01 PROCEDURE — ? COUNSELING

## 2020-05-01 PROCEDURE — 11900 INJECT SKIN LESIONS </W 7: CPT

## 2020-05-01 ASSESSMENT — LOCATION ZONE DERM: LOCATION ZONE: TRUNK

## 2020-05-01 ASSESSMENT — LOCATION DETAILED DESCRIPTION DERM: LOCATION DETAILED: RIGHT MEDIAL SUPERIOR CHEST

## 2020-05-01 ASSESSMENT — LOCATION SIMPLE DESCRIPTION DERM: LOCATION SIMPLE: CHEST

## 2020-05-01 NOTE — HPI: CYST
How Severe Is Your Cyst?: mild
Is This A New Presentation, Or A Follow-Up?: Cyst
Additional History: Has been on antibiotics for 2 days Augmentin 875

## 2020-05-01 NOTE — PROCEDURE: INTRALESIONAL KENALOG
Administered By (Optional): Armani
Detail Level: Detailed
Include Z78.9 (Other Specified Conditions Influencing Health Status) As An Associated Diagnosis?: No
Size Of Lesion (Optional): 1.5
X Size Of Lesion In Cm (Optional): 1.7
Medical Necessity Clause: This procedure was medically necessary because the lesions that were treated were:
Concentration Of Kenalog Solution Injected (Mg/Ml): 10.0
Consent: The risks of atrophy were reviewed with the patient.
Kenalog Preparation: Kenalog
Total Volume (Ccs): 0.5

## 2020-05-26 ENCOUNTER — APPOINTMENT (RX ONLY)
Dept: URBAN - METROPOLITAN AREA CLINIC 4 | Facility: CLINIC | Age: 65
Setting detail: DERMATOLOGY
End: 2020-05-26

## 2020-05-26 DIAGNOSIS — L72.8 OTHER FOLLICULAR CYSTS OF THE SKIN AND SUBCUTANEOUS TISSUE: ICD-10-CM

## 2020-05-26 PROBLEM — D48.5 NEOPLASM OF UNCERTAIN BEHAVIOR OF SKIN: Status: ACTIVE | Noted: 2020-05-26

## 2020-05-26 PROCEDURE — 11403 EXC TR-EXT B9+MARG 2.1-3CM: CPT

## 2020-05-26 PROCEDURE — 13101 CMPLX RPR TRUNK 2.6-7.5 CM: CPT

## 2020-05-26 PROCEDURE — ? EXCISION

## 2020-05-26 ASSESSMENT — LOCATION ZONE DERM: LOCATION ZONE: TRUNK

## 2020-05-26 ASSESSMENT — LOCATION DETAILED DESCRIPTION DERM: LOCATION DETAILED: RIGHT MEDIAL SUPERIOR CHEST

## 2020-05-26 ASSESSMENT — LOCATION SIMPLE DESCRIPTION DERM: LOCATION SIMPLE: CHEST

## 2020-05-26 NOTE — PROCEDURE: EXCISION
Medical Necessity Information: It is in your best interest to select a reason for this procedure from the list below. All of these items fulfill various CMS LCD requirements except lesion extends to a margin.
Include Z78.9 (Other Specified Conditions Influencing Health Status) As An Associated Diagnosis?: No
Medical Necessity Clause: This procedure was medically necessary because the lesion that was treated was:
Lab: [id number]
Lab Facility: 
Surgeon (Optional): Sade
Biopsy Photograph Reviewed: Yes
Size Of Lesion In Cm: 2.2
X Size Of Lesion In Cm (Optional): 0
Size Of Margin In Cm: 0.2
Excision Method: Elliptical
Repair Type: Complex
Intermediate / Complex Repair - Final Wound Length In Cm: 4.1
Width Of Defect Perpendicular To Closure In Cm (Required): 1.5
Distance Of Undermining In Cm (Required): 1.8
Undermining Type: Entire Wound
Debridement Text: The wound edges were debrided prior to proceeding with the closure to facilitate wound healing.
Helical Rim Text: The closure involved the helical rim.
Vermilion Border Text: The closure involved the vermilion border.
Nostril Rim Text: The closure involved the nostril rim.
Retention Suture Text: Retention sutures were placed to support the closure and prevent dehiscence.
Epidermal Closure Graft Donor Site (Optional): simple interrupted
Graft Donor Site Bandage (Optional-Leave Blank If You Don't Want In Note): Steri-strips and a pressure bandage were applied to the donor site.
Detail Level: Detailed
Excision Depth: adipose tissue
Scalpel Size: 15 blade
Anesthesia Type: 1% lidocaine with 1:100,000 epinephrine and 408mcg clindamycin/ml and a 1:10 solution of 8.4% sodium bicarbonate
Additional Anesthesia Volume In Cc: 6
Hemostasis: Electrocautery
Estimated Blood Loss (Cc): minimal
Repair Anesthesia Method: local infiltration
Anesthesia Volume In Cc: 8.6
Deep Sutures: 4-0 Vicryl
Dermal Closure: buried vertical mattress
Epidermal Sutures: 5-0 Caprosyn
Epidermal Closure: running subcuticular
Wound Care: Bacitracin
Dressing: steri-strips
Complex Repair Preamble Text (Leave Blank If You Do Not Want): Extensive wide undermining was performed.
Intermediate Repair Preamble Text (Leave Blank If You Do Not Want): Undermining was performed with blunt dissection.
Fusiform Excision Additional Text (Leave Blank If You Do Not Want): The margin was drawn around the clinically apparent lesion.  A fusiform shape was then drawn on the skin incorporating the lesion and margins.  Incisions were then made along these lines to the appropriate tissue plane and the lesion was extirpated.
Eliptical Excision Additional Text (Leave Blank If You Do Not Want): The margin was drawn around the clinically apparent lesion.  An elliptical shape was then drawn on the skin incorporating the lesion and margins.  Incisions were then made along these lines to the appropriate tissue plane and the lesion was extirpated.
Saucerization Excision Additional Text (Leave Blank If You Do Not Want): The margin was drawn around the clinically apparent lesion.  Incisions were then made along these lines, in a tangential fashion, to the appropriate tissue plane and the lesion was extirpated.
Slit Excision Additional Text (Leave Blank If You Do Not Want): A linear line was drawn on the skin overlying the lesion. An incision was made slowly until the lesion was visualized.  Once visualized, the lesion was removed with blunt dissection.
Excisional Biopsy Additional Text (Leave Blank If You Do Not Want): The margin was drawn around the clinically apparent lesion. An elliptical shape was then drawn on the skin incorporating the lesion and margins.  Incisions were then made along these lines to the appropriate tissue plane and the lesion was extirpated.
Perilesional Excision Additional Text (Leave Blank If You Do Not Want): The margin was drawn around the clinically apparent lesion. Incisions were then made along these lines to the appropriate tissue plane and the lesion was extirpated.
Repair Performed By Another Provider Text (Leave Blank If You Do Not Want): After the tissue was excised the defect was repaired by another provider.
No Repair - Repaired With Adjacent Surgical Defect Text (Leave Blank If You Do Not Want): After the excision the defect was repaired concurrently with another surgical defect which was in close approximation.
Advancement Flap (Single) Text: The defect edges were debeveled with a #15 scalpel blade.  Given the location of the defect and the proximity to free margins a single advancement flap was deemed most appropriate.  Using a sterile surgical marker, an appropriate advancement flap was drawn incorporating the defect and placing the expected incisions within the relaxed skin tension lines where possible.    The area thus outlined was incised deep to adipose tissue with a #15 scalpel blade.  The skin margins were undermined to an appropriate distance in all directions utilizing iris scissors.
Advancement Flap (Double) Text: The defect edges were debeveled with a #15 scalpel blade.  Given the location of the defect and the proximity to free margins a double advancement flap was deemed most appropriate.  Using a sterile surgical marker, the appropriate advancement flaps were drawn incorporating the defect and placing the expected incisions within the relaxed skin tension lines where possible.    The area thus outlined was incised deep to adipose tissue with a #15 scalpel blade.  The skin margins were undermined to an appropriate distance in all directions utilizing iris scissors.
Burow's Advancement Flap Text: The defect edges were debeveled with a #15 scalpel blade.  Given the location of the defect and the proximity to free margins a Burow's advancement flap was deemed most appropriate.  Using a sterile surgical marker, the appropriate advancement flap was drawn incorporating the defect and placing the expected incisions within the relaxed skin tension lines where possible.    The area thus outlined was incised deep to adipose tissue with a #15 scalpel blade.  The skin margins were undermined to an appropriate distance in all directions utilizing iris scissors.
Chonodrocutaneous Helical Advancement Flap Text: The defect edges were debeveled with a #15 scalpel blade.  Given the location of the defect and the proximity to free margins a chondrocutaneous helical advancement flap was deemed most appropriate.  Using a sterile surgical marker, the appropriate advancement flap was drawn incorporating the defect and placing the expected incisions within the relaxed skin tension lines where possible.    The area thus outlined was incised deep to adipose tissue with a #15 scalpel blade.  The skin margins were undermined to an appropriate distance in all directions utilizing iris scissors.
Crescentic Advancement Flap Text: The defect edges were debeveled with a #15 scalpel blade.  Given the location of the defect and the proximity to free margins a crescentic advancement flap was deemed most appropriate.  Using a sterile surgical marker, the appropriate advancement flap was drawn incorporating the defect and placing the expected incisions within the relaxed skin tension lines where possible.    The area thus outlined was incised deep to adipose tissue with a #15 scalpel blade.  The skin margins were undermined to an appropriate distance in all directions utilizing iris scissors.
A-T Advancement Flap Text: The defect edges were debeveled with a #15 scalpel blade.  Given the location of the defect, shape of the defect and the proximity to free margins an A-T advancement flap was deemed most appropriate.  Using a sterile surgical marker, an appropriate advancement flap was drawn incorporating the defect and placing the expected incisions within the relaxed skin tension lines where possible.    The area thus outlined was incised deep to adipose tissue with a #15 scalpel blade.  The skin margins were undermined to an appropriate distance in all directions utilizing iris scissors.
O-T Advancement Flap Text: The defect edges were debeveled with a #15 scalpel blade.  Given the location of the defect, shape of the defect and the proximity to free margins an O-T advancement flap was deemed most appropriate.  Using a sterile surgical marker, an appropriate advancement flap was drawn incorporating the defect and placing the expected incisions within the relaxed skin tension lines where possible.    The area thus outlined was incised deep to adipose tissue with a #15 scalpel blade.  The skin margins were undermined to an appropriate distance in all directions utilizing iris scissors.
O-L Flap Text: The defect edges were debeveled with a #15 scalpel blade.  Given the location of the defect, shape of the defect and the proximity to free margins an O-L flap was deemed most appropriate.  Using a sterile surgical marker, an appropriate advancement flap was drawn incorporating the defect and placing the expected incisions within the relaxed skin tension lines where possible.    The area thus outlined was incised deep to adipose tissue with a #15 scalpel blade.  The skin margins were undermined to an appropriate distance in all directions utilizing iris scissors.
O-Z Flap Text: The defect edges were debeveled with a #15 scalpel blade.  Given the location of the defect, shape of the defect and the proximity to free margins an O-Z flap was deemed most appropriate.  Using a sterile surgical marker, an appropriate transposition flap was drawn incorporating the defect and placing the expected incisions within the relaxed skin tension lines where possible. The area thus outlined was incised deep to adipose tissue with a #15 scalpel blade.  The skin margins were undermined to an appropriate distance in all directions utilizing iris scissors.
Double O-Z Flap Text: The defect edges were debeveled with a #15 scalpel blade.  Given the location of the defect, shape of the defect and the proximity to free margins a Double O-Z flap was deemed most appropriate.  Using a sterile surgical marker, an appropriate transposition flap was drawn incorporating the defect and placing the expected incisions within the relaxed skin tension lines where possible. The area thus outlined was incised deep to adipose tissue with a #15 scalpel blade.  The skin margins were undermined to an appropriate distance in all directions utilizing iris scissors.
V-Y Flap Text: The defect edges were debeveled with a #15 scalpel blade.  Given the location of the defect, shape of the defect and the proximity to free margins a V-Y flap was deemed most appropriate.  Using a sterile surgical marker, an appropriate advancement flap was drawn incorporating the defect and placing the expected incisions within the relaxed skin tension lines where possible.    The area thus outlined was incised deep to adipose tissue with a #15 scalpel blade.  The skin margins were undermined to an appropriate distance in all directions utilizing iris scissors.
Mercedes Flap Text: The defect edges were debeveled with a #15 scalpel blade.  Given the location of the defect, shape of the defect and the proximity to free margins a Mercedes flap was deemed most appropriate.  Using a sterile surgical marker, an appropriate advancement flap was drawn incorporating the defect and placing the expected incisions within the relaxed skin tension lines where possible. The area thus outlined was incised deep to adipose tissue with a #15 scalpel blade.  The skin margins were undermined to an appropriate distance in all directions utilizing iris scissors.
Modified Advancement Flap Text: The defect edges were debeveled with a #15 scalpel blade.  Given the location of the defect, shape of the defect and the proximity to free margins a modified advancement flap was deemed most appropriate.  Using a sterile surgical marker, an appropriate advancement flap was drawn incorporating the defect and placing the expected incisions within the relaxed skin tension lines where possible.    The area thus outlined was incised deep to adipose tissue with a #15 scalpel blade.  The skin margins were undermined to an appropriate distance in all directions utilizing iris scissors.
Mucosal Advancement Flap Text: Given the location of the defect, shape of the defect and the proximity to free margins a mucosal advancement flap was deemed most appropriate. Incisions were made with a 15 blade scalpel in the appropriate fashion along the cutaneous vermillion border and the mucosal lip. The remaining actinically damaged mucosal tissue was excised.  The mucosal advancement flap was then elevated to the gingival sulcus with care taken to preserve the neurovascular structures and advanced into the primary defect. Care was taken to ensure that precise realignment of the vermilion border was achieved.
Hatchet Flap Text: The defect edges were debeveled with a #15 scalpel blade.  Given the location of the defect, shape of the defect and the proximity to free margins a hatchet flap was deemed most appropriate.  Using a sterile surgical marker, an appropriate hatchet flap was drawn incorporating the defect and placing the expected incisions within the relaxed skin tension lines where possible.    The area thus outlined was incised deep to adipose tissue with a #15 scalpel blade.  The skin margins were undermined to an appropriate distance in all directions utilizing iris scissors.
Rotation Flap Text: The defect edges were debeveled with a #15 scalpel blade.  Given the location of the defect, shape of the defect and the proximity to free margins a rotation flap was deemed most appropriate.  Using a sterile surgical marker, an appropriate rotation flap was drawn incorporating the defect and placing the expected incisions within the relaxed skin tension lines where possible.    The area thus outlined was incised deep to adipose tissue with a #15 scalpel blade.  The skin margins were undermined to an appropriate distance in all directions utilizing iris scissors.
Spiral Flap Text: The defect edges were debeveled with a #15 scalpel blade.  Given the location of the defect, shape of the defect and the proximity to free margins a spiral flap was deemed most appropriate.  Using a sterile surgical marker, an appropriate rotation flap was drawn incorporating the defect and placing the expected incisions within the relaxed skin tension lines where possible. The area thus outlined was incised deep to adipose tissue with a #15 scalpel blade.  The skin margins were undermined to an appropriate distance in all directions utilizing iris scissors.
Star Wedge Flap Text: The defect edges were debeveled with a #15 scalpel blade.  Given the location of the defect, shape of the defect and the proximity to free margins a star wedge flap was deemed most appropriate.  Using a sterile surgical marker, an appropriate rotation flap was drawn incorporating the defect and placing the expected incisions within the relaxed skin tension lines where possible. The area thus outlined was incised deep to adipose tissue with a #15 scalpel blade.  The skin margins were undermined to an appropriate distance in all directions utilizing iris scissors.
Transposition Flap Text: The defect edges were debeveled with a #15 scalpel blade.  Given the location of the defect and the proximity to free margins a transposition flap was deemed most appropriate.  Using a sterile surgical marker, an appropriate transposition flap was drawn incorporating the defect.    The area thus outlined was incised deep to adipose tissue with a #15 scalpel blade.  The skin margins were undermined to an appropriate distance in all directions utilizing iris scissors.
Muscle Hinge Flap Text: The defect edges were debeveled with a #15 scalpel blade.  Given the size, depth and location of the defect and the proximity to free margins a muscle hinge flap was deemed most appropriate.  Using a sterile surgical marker, an appropriate hinge flap was drawn incorporating the defect. The area thus outlined was incised with a #15 scalpel blade.  The skin margins were undermined to an appropriate distance in all directions utilizing iris scissors.
Melolabial Transposition Flap Text: The defect edges were debeveled with a #15 scalpel blade.  Given the location of the defect and the proximity to free margins a melolabial flap was deemed most appropriate.  Using a sterile surgical marker, an appropriate melolabial transposition flap was drawn incorporating the defect.    The area thus outlined was incised deep to adipose tissue with a #15 scalpel blade.  The skin margins were undermined to an appropriate distance in all directions utilizing iris scissors.
Rhombic Flap Text: The defect edges were debeveled with a #15 scalpel blade.  Given the location of the defect and the proximity to free margins a rhombic flap was deemed most appropriate.  Using a sterile surgical marker, an appropriate rhombic flap was drawn incorporating the defect.    The area thus outlined was incised deep to adipose tissue with a #15 scalpel blade.  The skin margins were undermined to an appropriate distance in all directions utilizing iris scissors.
Rhomboid Transposition Flap Text: The defect edges were debeveled with a #15 scalpel blade.  Given the location of the defect and the proximity to free margins a rhomboid transposition flap was deemed most appropriate.  Using a sterile surgical marker, an appropriate rhomboid flap was drawn incorporating the defect.    The area thus outlined was incised deep to adipose tissue with a #15 scalpel blade.  The skin margins were undermined to an appropriate distance in all directions utilizing iris scissors.
Bi-Rhombic Flap Text: The defect edges were debeveled with a #15 scalpel blade.  Given the location of the defect and the proximity to free margins a bi-rhombic flap was deemed most appropriate.  Using a sterile surgical marker, an appropriate rhombic flap was drawn incorporating the defect. The area thus outlined was incised deep to adipose tissue with a #15 scalpel blade.  The skin margins were undermined to an appropriate distance in all directions utilizing iris scissors.
Helical Rim Advancement Flap Text: The defect edges were debeveled with a #15 blade scalpel.  Given the location of the defect and the proximity to free margins (helical rim) a double helical rim advancement flap was deemed most appropriate.  Using a sterile surgical marker, the appropriate advancement flaps were drawn incorporating the defect and placing the expected incisions between the helical rim and antihelix where possible.  The area thus outlined was incised through and through with a #15 scalpel blade.  With a skin hook and iris scissors, the flaps were gently and sharply undermined and freed up.
Bilateral Helical Rim Advancement Flap Text: The defect edges were debeveled with a #15 blade scalpel.  Given the location of the defect and the proximity to free margins (helical rim) a bilateral helical rim advancement flap was deemed most appropriate.  Using a sterile surgical marker, the appropriate advancement flaps were drawn incorporating the defect and placing the expected incisions between the helical rim and antihelix where possible.  The area thus outlined was incised through and through with a #15 scalpel blade.  With a skin hook and iris scissors, the flaps were gently and sharply undermined and freed up.
Ear Star Wedge Flap Text: The defect edges were debeveled with a #15 blade scalpel.  Given the location of the defect and the proximity to free margins (helical rim) an ear star wedge flap was deemed most appropriate.  Using a sterile surgical marker, the appropriate flap was drawn incorporating the defect and placing the expected incisions between the helical rim and antihelix where possible.  The area thus outlined was incised through and through with a #15 scalpel blade.
Banner Transposition Flap Text: The defect edges were debeveled with a #15 scalpel blade.  Given the location of the defect and the proximity to free margins a Banner transposition flap was deemed most appropriate.  Using a sterile surgical marker, an appropriate flap drawn around the defect. The area thus outlined was incised deep to adipose tissue with a #15 scalpel blade.  The skin margins were undermined to an appropriate distance in all directions utilizing iris scissors.
Bilobed Flap Text: The defect edges were debeveled with a #15 scalpel blade.  Given the location of the defect and the proximity to free margins a bilobe flap was deemed most appropriate.  Using a sterile surgical marker, an appropriate bilobe flap drawn around the defect.    The area thus outlined was incised deep to adipose tissue with a #15 scalpel blade.  The skin margins were undermined to an appropriate distance in all directions utilizing iris scissors.
Bilobed Transposition Flap Text: The defect edges were debeveled with a #15 scalpel blade.  Given the location of the defect and the proximity to free margins a bilobed transposition flap was deemed most appropriate.  Using a sterile surgical marker, an appropriate bilobe flap drawn around the defect.    The area thus outlined was incised deep to adipose tissue with a #15 scalpel blade.  The skin margins were undermined to an appropriate distance in all directions utilizing iris scissors.
Trilobed Flap Text: The defect edges were debeveled with a #15 scalpel blade.  Given the location of the defect and the proximity to free margins a trilobed flap was deemed most appropriate.  Using a sterile surgical marker, an appropriate trilobed flap drawn around the defect.    The area thus outlined was incised deep to adipose tissue with a #15 scalpel blade.  The skin margins were undermined to an appropriate distance in all directions utilizing iris scissors.
Dorsal Nasal Flap Text: The defect edges were debeveled with a #15 scalpel blade.  Given the location of the defect and the proximity to free margins a dorsal nasal flap was deemed most appropriate.  Using a sterile surgical marker, an appropriate dorsal nasal flap was drawn around the defect.    The area thus outlined was incised deep to adipose tissue with a #15 scalpel blade.  The skin margins were undermined to an appropriate distance in all directions utilizing iris scissors.
Island Pedicle Flap Text: The defect edges were debeveled with a #15 scalpel blade.  Given the location of the defect, shape of the defect and the proximity to free margins an island pedicle advancement flap was deemed most appropriate.  Using a sterile surgical marker, an appropriate advancement flap was drawn incorporating the defect, outlining the appropriate donor tissue and placing the expected incisions within the relaxed skin tension lines where possible.    The area thus outlined was incised deep to adipose tissue with a #15 scalpel blade.  The skin margins were undermined to an appropriate distance in all directions around the primary defect and laterally outward around the island pedicle utilizing iris scissors.  There was minimal undermining beneath the pedicle flap.
Island Pedicle Flap With Canthal Suspension Text: The defect edges were debeveled with a #15 scalpel blade.  Given the location of the defect, shape of the defect and the proximity to free margins an island pedicle advancement flap was deemed most appropriate.  Using a sterile surgical marker, an appropriate advancement flap was drawn incorporating the defect, outlining the appropriate donor tissue and placing the expected incisions within the relaxed skin tension lines where possible. The area thus outlined was incised deep to adipose tissue with a #15 scalpel blade.  The skin margins were undermined to an appropriate distance in all directions around the primary defect and laterally outward around the island pedicle utilizing iris scissors.  There was minimal undermining beneath the pedicle flap. A suspension suture was placed in the canthal tendon to prevent tension and prevent ectropion.
Alar Island Pedicle Flap Text: The defect edges were debeveled with a #15 scalpel blade.  Given the location of the defect, shape of the defect and the proximity to the alar rim an island pedicle advancement flap was deemed most appropriate.  Using a sterile surgical marker, an appropriate advancement flap was drawn incorporating the defect, outlining the appropriate donor tissue and placing the expected incisions within the nasal ala running parallel to the alar rim. The area thus outlined was incised with a #15 scalpel blade.  The skin margins were undermined minimally to an appropriate distance in all directions around the primary defect and laterally outward around the island pedicle utilizing iris scissors.  There was minimal undermining beneath the pedicle flap.
Double Island Pedicle Flap Text: The defect edges were debeveled with a #15 scalpel blade.  Given the location of the defect, shape of the defect and the proximity to free margins a double island pedicle advancement flap was deemed most appropriate.  Using a sterile surgical marker, an appropriate advancement flap was drawn incorporating the defect, outlining the appropriate donor tissue and placing the expected incisions within the relaxed skin tension lines where possible.    The area thus outlined was incised deep to adipose tissue with a #15 scalpel blade.  The skin margins were undermined to an appropriate distance in all directions around the primary defect and laterally outward around the island pedicle utilizing iris scissors.  There was minimal undermining beneath the pedicle flap.
Island Pedicle Flap-Requiring Vessel Identification Text: The defect edges were debeveled with a #15 scalpel blade.  Given the location of the defect, shape of the defect and the proximity to free margins an island pedicle advancement flap was deemed most appropriate.  Using a sterile surgical marker, an appropriate advancement flap was drawn, based on the axial vessel mentioned above, incorporating the defect, outlining the appropriate donor tissue and placing the expected incisions within the relaxed skin tension lines where possible.    The area thus outlined was incised deep to adipose tissue with a #15 scalpel blade.  The skin margins were undermined to an appropriate distance in all directions around the primary defect and laterally outward around the island pedicle utilizing iris scissors.  There was minimal undermining beneath the pedicle flap.
Keystone Flap Text: The defect edges were debeveled with a #15 scalpel blade.  Given the location of the defect, shape of the defect a keystone flap was deemed most appropriate.  Using a sterile surgical marker, an appropriate keystone flap was drawn incorporating the defect, outlining the appropriate donor tissue and placing the expected incisions within the relaxed skin tension lines where possible. The area thus outlined was incised deep to adipose tissue with a #15 scalpel blade.  The skin margins were undermined to an appropriate distance in all directions around the primary defect and laterally outward around the flap utilizing iris scissors.
O-T Plasty Text: The defect edges were debeveled with a #15 scalpel blade.  Given the location of the defect, shape of the defect and the proximity to free margins an O-T plasty was deemed most appropriate.  Using a sterile surgical marker, an appropriate O-T plasty was drawn incorporating the defect and placing the expected incisions within the relaxed skin tension lines where possible.    The area thus outlined was incised deep to adipose tissue with a #15 scalpel blade.  The skin margins were undermined to an appropriate distance in all directions utilizing iris scissors.
O-Z Plasty Text: The defect edges were debeveled with a #15 scalpel blade.  Given the location of the defect, shape of the defect and the proximity to free margins an O-Z plasty (double transposition flap) was deemed most appropriate.  Using a sterile surgical marker, the appropriate transposition flaps were drawn incorporating the defect and placing the expected incisions within the relaxed skin tension lines where possible.    The area thus outlined was incised deep to adipose tissue with a #15 scalpel blade.  The skin margins were undermined to an appropriate distance in all directions utilizing iris scissors.  Hemostasis was achieved with electrocautery.  The flaps were then transposed into place, one clockwise and the other counterclockwise, and anchored with interrupted buried subcutaneous sutures.
Double O-Z Plasty Text: The defect edges were debeveled with a #15 scalpel blade.  Given the location of the defect, shape of the defect and the proximity to free margins a Double O-Z plasty (double transposition flap) was deemed most appropriate.  Using a sterile surgical marker, the appropriate transposition flaps were drawn incorporating the defect and placing the expected incisions within the relaxed skin tension lines where possible. The area thus outlined was incised deep to adipose tissue with a #15 scalpel blade.  The skin margins were undermined to an appropriate distance in all directions utilizing iris scissors.  Hemostasis was achieved with electrocautery.  The flaps were then transposed into place, one clockwise and the other counterclockwise, and anchored with interrupted buried subcutaneous sutures.
S Plasty Text: Given the location and shape of the defect, and the orientation of relaxed skin tension lines, an S-plasty was deemed most appropriate for repair.  Using a sterile surgical marker, the appropriate outline of the S-plasty was drawn, incorporating the defect and placing the expected incisions within the relaxed skin tension lines where possible.  The area thus outlined was incised deep to adipose tissue with a #15 scalpel blade.  The skin margins were undermined to an appropriate distance in all directions utilizing iris scissors. The skin flaps were advanced over the defect.  The opposing margins were then approximated with interrupted buried subcutaneous sutures.
V-Y Plasty Text: The defect edges were debeveled with a #15 scalpel blade.  Given the location of the defect, shape of the defect and the proximity to free margins an V-Y advancement flap was deemed most appropriate.  Using a sterile surgical marker, an appropriate advancement flap was drawn incorporating the defect and placing the expected incisions within the relaxed skin tension lines where possible.    The area thus outlined was incised deep to adipose tissue with a #15 scalpel blade.  The skin margins were undermined to an appropriate distance in all directions utilizing iris scissors.
H Plasty Text: Given the location of the defect, shape of the defect and the proximity to free margins a H-plasty was deemed most appropriate for repair.  Using a sterile surgical marker, the appropriate advancement arms of the H-plasty were drawn incorporating the defect and placing the expected incisions within the relaxed skin tension lines where possible. The area thus outlined was incised deep to adipose tissue with a #15 scalpel blade. The skin margins were undermined to an appropriate distance in all directions utilizing iris scissors.  The opposing advancement arms were then advanced into place in opposite direction and anchored with interrupted buried subcutaneous sutures.
W Plasty Text: The lesion was extirpated to the level of the fat with a #15 scalpel blade.  Given the location of the defect, shape of the defect and the proximity to free margins a W-plasty was deemed most appropriate for repair.  Using a sterile surgical marker, the appropriate transposition arms of the W-plasty were drawn incorporating the defect and placing the expected incisions within the relaxed skin tension lines where possible.    The area thus outlined was incised deep to adipose tissue with a #15 scalpel blade.  The skin margins were undermined to an appropriate distance in all directions utilizing iris scissors.  The opposing transposition arms were then transposed into place in opposite direction and anchored with interrupted buried subcutaneous sutures.
Z Plasty Text: The lesion was extirpated to the level of the fat with a #15 scalpel blade.  Given the location of the defect, shape of the defect and the proximity to free margins a Z-plasty was deemed most appropriate for repair.  Using a sterile surgical marker, the appropriate transposition arms of the Z-plasty were drawn incorporating the defect and placing the expected incisions within the relaxed skin tension lines where possible.    The area thus outlined was incised deep to adipose tissue with a #15 scalpel blade.  The skin margins were undermined to an appropriate distance in all directions utilizing iris scissors.  The opposing transposition arms were then transposed into place in opposite direction and anchored with interrupted buried subcutaneous sutures.
Cheek Interpolation Flap Text: A decision was made to reconstruct the defect utilizing an interpolation axial flap and a staged reconstruction.  A telfa template was made of the defect.  This telfa template was then used to outline the Cheek Interpolation flap.  The donor area for the pedicle flap was then injected with anesthesia.  The flap was excised through the skin and subcutaneous tissue down to the layer of the underlying musculature.  The interpolation flap was carefully excised within this deep plane to maintain its blood supply.  The edges of the donor site were undermined.   The donor site was closed in a primary fashion.  The pedicle was then rotated into position and sutured.  Once the tube was sutured into place, adequate blood supply was confirmed with blanching and refill.  The pedicle was then wrapped with xeroform gauze and dressed appropriately with a telfa and gauze bandage to ensure continued blood supply and protect the attached pedicle.
Cheek-To-Nose Interpolation Flap Text: A decision was made to reconstruct the defect utilizing an interpolation axial flap and a staged reconstruction.  A telfa template was made of the defect.  This telfa template was then used to outline the Cheek-To-Nose Interpolation flap.  The donor area for the pedicle flap was then injected with anesthesia.  The flap was excised through the skin and subcutaneous tissue down to the layer of the underlying musculature.  The interpolation flap was carefully excised within this deep plane to maintain its blood supply.  The edges of the donor site were undermined.   The donor site was closed in a primary fashion.  The pedicle was then rotated into position and sutured.  Once the tube was sutured into place, adequate blood supply was confirmed with blanching and refill.  The pedicle was then wrapped with xeroform gauze and dressed appropriately with a telfa and gauze bandage to ensure continued blood supply and protect the attached pedicle.
Interpolation Flap Text: A decision was made to reconstruct the defect utilizing an interpolation axial flap and a staged reconstruction.  A telfa template was made of the defect.  This telfa template was then used to outline the interpolation flap.  The donor area for the pedicle flap was then injected with anesthesia.  The flap was excised through the skin and subcutaneous tissue down to the layer of the underlying musculature.  The interpolation flap was carefully excised within this deep plane to maintain its blood supply.  The edges of the donor site were undermined.   The donor site was closed in a primary fashion.  The pedicle was then rotated into position and sutured.  Once the tube was sutured into place, adequate blood supply was confirmed with blanching and refill.  The pedicle was then wrapped with xeroform gauze and dressed appropriately with a telfa and gauze bandage to ensure continued blood supply and protect the attached pedicle.
Melolabial Interpolation Flap Text: A decision was made to reconstruct the defect utilizing an interpolation axial flap and a staged reconstruction.  A telfa template was made of the defect.  This telfa template was then used to outline the melolabial interpolation flap.  The donor area for the pedicle flap was then injected with anesthesia.  The flap was excised through the skin and subcutaneous tissue down to the layer of the underlying musculature.  The pedicle flap was carefully excised within this deep plane to maintain its blood supply.  The edges of the donor site were undermined.   The donor site was closed in a primary fashion.  The pedicle was then rotated into position and sutured.  Once the tube was sutured into place, adequate blood supply was confirmed with blanching and refill.  The pedicle was then wrapped with xeroform gauze and dressed appropriately with a telfa and gauze bandage to ensure continued blood supply and protect the attached pedicle.
Mastoid Interpolation Flap Text: A decision was made to reconstruct the defect utilizing an interpolation axial flap and a staged reconstruction.  A telfa template was made of the defect.  This telfa template was then used to outline the mastoid interpolation flap.  The donor area for the pedicle flap was then injected with anesthesia.  The flap was excised through the skin and subcutaneous tissue down to the layer of the underlying musculature.  The pedicle flap was carefully excised within this deep plane to maintain its blood supply.  The edges of the donor site were undermined.   The donor site was closed in a primary fashion.  The pedicle was then rotated into position and sutured.  Once the tube was sutured into place, adequate blood supply was confirmed with blanching and refill.  The pedicle was then wrapped with xeroform gauze and dressed appropriately with a telfa and gauze bandage to ensure continued blood supply and protect the attached pedicle.
Posterior Auricular Interpolation Flap Text: A decision was made to reconstruct the defect utilizing an interpolation axial flap and a staged reconstruction.  A telfa template was made of the defect.  This telfa template was then used to outline the posterior auricular interpolation flap.  The donor area for the pedicle flap was then injected with anesthesia.  The flap was excised through the skin and subcutaneous tissue down to the layer of the underlying musculature.  The pedicle flap was carefully excised within this deep plane to maintain its blood supply.  The edges of the donor site were undermined.   The donor site was closed in a primary fashion.  The pedicle was then rotated into position and sutured.  Once the tube was sutured into place, adequate blood supply was confirmed with blanching and refill.  The pedicle was then wrapped with xeroform gauze and dressed appropriately with a telfa and gauze bandage to ensure continued blood supply and protect the attached pedicle.
Paramedian Forehead Flap Text: A decision was made to reconstruct the defect utilizing an interpolation axial flap and a staged reconstruction.  A telfa template was made of the defect.  This telfa template was then used to outline the paramedian forehead pedicle flap.  The donor area for the pedicle flap was then injected with anesthesia.  The flap was excised through the skin and subcutaneous tissue down to the layer of the underlying musculature.  The pedicle flap was carefully excised within this deep plane to maintain its blood supply.  The edges of the donor site were undermined.   The donor site was closed in a primary fashion.  The pedicle was then rotated into position and sutured.  Once the tube was sutured into place, adequate blood supply was confirmed with blanching and refill.  The pedicle was then wrapped with xeroform gauze and dressed appropriately with a telfa and gauze bandage to ensure continued blood supply and protect the attached pedicle.
Lip Wedge Excision Repair Text: Given the location of the defect and the proximity to free margins a full thickness wedge repair was deemed most appropriate.  Using a sterile surgical marker, the appropriate repair was drawn incorporating the defect and placing the expected incisions perpendicular to the vermilion border.  The vermilion border was also meticulously outlined to ensure appropriate reapproximation during the repair.  The area thus outlined was incised through and through with a #15 scalpel blade.  The muscularis and dermis were reaproximated with deep sutures following hemostasis. Care was taken to realign the vermilion border before proceeding with the superficial closure.  Once the vermilion was realigned the superfical and mucosal closure was finished.
Ftsg Text: The defect edges were debeveled with a #15 scalpel blade.  Given the location of the defect, shape of the defect and the proximity to free margins a full thickness skin graft was deemed most appropriate.  Using a sterile surgical marker, the primary defect shape was transferred to the donor site. The area thus outlined was incised deep to adipose tissue with a #15 scalpel blade.  The harvested graft was then trimmed of adipose tissue until only dermis and epidermis was left.  The skin margins of the secondary defect were undermined to an appropriate distance in all directions utilizing iris scissors.  The secondary defect was closed with interrupted buried subcutaneous sutures.  The skin edges were then re-apposed with running  sutures.  The skin graft was then placed in the primary defect and oriented appropriately.
Split-Thickness Skin Graft Text: The defect edges were debeveled with a #15 scalpel blade.  Given the location of the defect, shape of the defect and the proximity to free margins a split thickness skin graft was deemed most appropriate.  Using a sterile surgical marker, the primary defect shape was transferred to the donor site. The split thickness graft was then harvested.  The skin graft was then placed in the primary defect and oriented appropriately.
Burow's Graft Text: The defect edges were debeveled with a #15 scalpel blade.  Given the location of the defect, shape of the defect, the proximity to free margins and the presence of a standing cone deformity a Burow's skin graft was deemed most appropriate. The standing cone was removed and this tissue was then trimmed to the shape of the primary defect. The adipose tissue was also removed until only dermis and epidermis were left.  The skin margins of the secondary defect were undermined to an appropriate distance in all directions utilizing iris scissors.  The secondary defect was closed with interrupted buried subcutaneous sutures.  The skin edges were then re-apposed with running  sutures.  The skin graft was then placed in the primary defect and oriented appropriately.
Cartilage Graft Text: The defect edges were debeveled with a #15 scalpel blade.  Given the location of the defect, shape of the defect, the fact the defect involved a full thickness cartilage defect a cartilage graft was deemed most appropriate.  An appropriate donor site was identified, cleansed, and anesthetized. The cartilage graft was then harvested and transferred to the recipient site, oriented appropriately and then sutured into place.  The secondary defect was then repaired using a primary closure.
Composite Graft Text: The defect edges were debeveled with a #15 scalpel blade.  Given the location of the defect, shape of the defect, the proximity to free margins and the fact the defect was full thickness a composite graft was deemed most appropriate.  The defect was outline and then transferred to the donor site.  A full thickness graft was then excised from the donor site. The graft was then placed in the primary defect, oriented appropriately and then sutured into place.  The secondary defect was then repaired using a primary closure.
Epidermal Autograft Text: The defect edges were debeveled with a #15 scalpel blade.  Given the location of the defect, shape of the defect and the proximity to free margins an epidermal autograft was deemed most appropriate.  Using a sterile surgical marker, the primary defect shape was transferred to the donor site. The epidermal graft was then harvested.  The skin graft was then placed in the primary defect and oriented appropriately.
Dermal Autograft Text: The defect edges were debeveled with a #15 scalpel blade.  Given the location of the defect, shape of the defect and the proximity to free margins a dermal autograft was deemed most appropriate.  Using a sterile surgical marker, the primary defect shape was transferred to the donor site. The area thus outlined was incised deep to adipose tissue with a #15 scalpel blade.  The harvested graft was then trimmed of adipose and epidermal tissue until only dermis was left.  The skin graft was then placed in the primary defect and oriented appropriately.
Skin Substitute Text: The defect edges were debeveled with a #15 scalpel blade.  Given the location of the defect, shape of the defect and the proximity to free margins a skin substitute graft was deemed most appropriate.  The graft material was trimmed to fit the size of the defect. The graft was then placed in the primary defect and oriented appropriately.
Tissue Cultured Epidermal Autograft Text: The defect edges were debeveled with a #15 scalpel blade.  Given the location of the defect, shape of the defect and the proximity to free margins a tissue cultured epidermal autograft was deemed most appropriate.  The graft was then trimmed to fit the size of the defect.  The graft was then placed in the primary defect and oriented appropriately.
Xenograft Text: The defect edges were debeveled with a #15 scalpel blade.  Given the location of the defect, shape of the defect and the proximity to free margins a xenograft was deemed most appropriate.  The graft was then trimmed to fit the size of the defect.  The graft was then placed in the primary defect and oriented appropriately.
Purse String (Intermediate) Text: Given the location of the defect and the characteristics of the surrounding skin a purse string intermediate closure was deemed most appropriate.  Undermining was performed circumferentially around the surgical defect.  A purse string suture was then placed and tightened.
Purse String (Simple) Text: Given the location of the defect and the characteristics of the surrounding skin a purse string simple closure was deemed most appropriate.  Undermining was performed circumferentially around the surgical defect.  A purse string suture was then placed and tightened.
Complex Repair And Single Advancement Flap Text: The defect edges were debeveled with a #15 scalpel blade.  The primary defect was closed partially with a complex linear closure.  Given the location of the remaining defect, shape of the defect and the proximity to free margins a single advancement flap was deemed most appropriate for complete closure of the defect.  Using a sterile surgical marker, an appropriate advancement flap was drawn incorporating the defect and placing the expected incisions within the relaxed skin tension lines where possible.    The area thus outlined was incised deep to adipose tissue with a #15 scalpel blade.  The skin margins were undermined to an appropriate distance in all directions utilizing iris scissors.
Complex Repair And Double Advancement Flap Text: The defect edges were debeveled with a #15 scalpel blade.  The primary defect was closed partially with a complex linear closure.  Given the location of the remaining defect, shape of the defect and the proximity to free margins a double advancement flap was deemed most appropriate for complete closure of the defect.  Using a sterile surgical marker, an appropriate advancement flap was drawn incorporating the defect and placing the expected incisions within the relaxed skin tension lines where possible.    The area thus outlined was incised deep to adipose tissue with a #15 scalpel blade.  The skin margins were undermined to an appropriate distance in all directions utilizing iris scissors.
Complex Repair And Modified Advancement Flap Text: The defect edges were debeveled with a #15 scalpel blade.  The primary defect was closed partially with a complex linear closure.  Given the location of the remaining defect, shape of the defect and the proximity to free margins a modified advancement flap was deemed most appropriate for complete closure of the defect.  Using a sterile surgical marker, an appropriate advancement flap was drawn incorporating the defect and placing the expected incisions within the relaxed skin tension lines where possible.    The area thus outlined was incised deep to adipose tissue with a #15 scalpel blade.  The skin margins were undermined to an appropriate distance in all directions utilizing iris scissors.
Complex Repair And A-T Advancement Flap Text: The defect edges were debeveled with a #15 scalpel blade.  The primary defect was closed partially with a complex linear closure.  Given the location of the remaining defect, shape of the defect and the proximity to free margins an A-T advancement flap was deemed most appropriate for complete closure of the defect.  Using a sterile surgical marker, an appropriate advancement flap was drawn incorporating the defect and placing the expected incisions within the relaxed skin tension lines where possible.    The area thus outlined was incised deep to adipose tissue with a #15 scalpel blade.  The skin margins were undermined to an appropriate distance in all directions utilizing iris scissors.
Complex Repair And O-T Advancement Flap Text: The defect edges were debeveled with a #15 scalpel blade.  The primary defect was closed partially with a complex linear closure.  Given the location of the remaining defect, shape of the defect and the proximity to free margins an O-T advancement flap was deemed most appropriate for complete closure of the defect.  Using a sterile surgical marker, an appropriate advancement flap was drawn incorporating the defect and placing the expected incisions within the relaxed skin tension lines where possible.    The area thus outlined was incised deep to adipose tissue with a #15 scalpel blade.  The skin margins were undermined to an appropriate distance in all directions utilizing iris scissors.
Complex Repair And O-L Flap Text: The defect edges were debeveled with a #15 scalpel blade.  The primary defect was closed partially with a complex linear closure.  Given the location of the remaining defect, shape of the defect and the proximity to free margins an O-L flap was deemed most appropriate for complete closure of the defect.  Using a sterile surgical marker, an appropriate flap was drawn incorporating the defect and placing the expected incisions within the relaxed skin tension lines where possible.    The area thus outlined was incised deep to adipose tissue with a #15 scalpel blade.  The skin margins were undermined to an appropriate distance in all directions utilizing iris scissors.
Complex Repair And Bilobe Flap Text: The defect edges were debeveled with a #15 scalpel blade.  The primary defect was closed partially with a complex linear closure.  Given the location of the remaining defect, shape of the defect and the proximity to free margins a bilobe flap was deemed most appropriate for complete closure of the defect.  Using a sterile surgical marker, an appropriate advancement flap was drawn incorporating the defect and placing the expected incisions within the relaxed skin tension lines where possible.    The area thus outlined was incised deep to adipose tissue with a #15 scalpel blade.  The skin margins were undermined to an appropriate distance in all directions utilizing iris scissors.
Complex Repair And Melolabial Flap Text: The defect edges were debeveled with a #15 scalpel blade.  The primary defect was closed partially with a complex linear closure.  Given the location of the remaining defect, shape of the defect and the proximity to free margins a melolabial flap was deemed most appropriate for complete closure of the defect.  Using a sterile surgical marker, an appropriate advancement flap was drawn incorporating the defect and placing the expected incisions within the relaxed skin tension lines where possible.    The area thus outlined was incised deep to adipose tissue with a #15 scalpel blade.  The skin margins were undermined to an appropriate distance in all directions utilizing iris scissors.
Complex Repair And Rotation Flap Text: The defect edges were debeveled with a #15 scalpel blade.  The primary defect was closed partially with a complex linear closure.  Given the location of the remaining defect, shape of the defect and the proximity to free margins a rotation flap was deemed most appropriate for complete closure of the defect.  Using a sterile surgical marker, an appropriate advancement flap was drawn incorporating the defect and placing the expected incisions within the relaxed skin tension lines where possible.    The area thus outlined was incised deep to adipose tissue with a #15 scalpel blade.  The skin margins were undermined to an appropriate distance in all directions utilizing iris scissors.
Complex Repair And Rhombic Flap Text: The defect edges were debeveled with a #15 scalpel blade.  The primary defect was closed partially with a complex linear closure.  Given the location of the remaining defect, shape of the defect and the proximity to free margins a rhombic flap was deemed most appropriate for complete closure of the defect.  Using a sterile surgical marker, an appropriate advancement flap was drawn incorporating the defect and placing the expected incisions within the relaxed skin tension lines where possible.    The area thus outlined was incised deep to adipose tissue with a #15 scalpel blade.  The skin margins were undermined to an appropriate distance in all directions utilizing iris scissors.
Complex Repair And Transposition Flap Text: The defect edges were debeveled with a #15 scalpel blade.  The primary defect was closed partially with a complex linear closure.  Given the location of the remaining defect, shape of the defect and the proximity to free margins a transposition flap was deemed most appropriate for complete closure of the defect.  Using a sterile surgical marker, an appropriate advancement flap was drawn incorporating the defect and placing the expected incisions within the relaxed skin tension lines where possible.    The area thus outlined was incised deep to adipose tissue with a #15 scalpel blade.  The skin margins were undermined to an appropriate distance in all directions utilizing iris scissors.
Complex Repair And V-Y Plasty Text: The defect edges were debeveled with a #15 scalpel blade.  The primary defect was closed partially with a complex linear closure.  Given the location of the remaining defect, shape of the defect and the proximity to free margins a V-Y plasty was deemed most appropriate for complete closure of the defect.  Using a sterile surgical marker, an appropriate advancement flap was drawn incorporating the defect and placing the expected incisions within the relaxed skin tension lines where possible.    The area thus outlined was incised deep to adipose tissue with a #15 scalpel blade.  The skin margins were undermined to an appropriate distance in all directions utilizing iris scissors.
Complex Repair And M Plasty Text: The defect edges were debeveled with a #15 scalpel blade.  The primary defect was closed partially with a complex linear closure.  Given the location of the remaining defect, shape of the defect and the proximity to free margins an M plasty was deemed most appropriate for complete closure of the defect.  Using a sterile surgical marker, an appropriate advancement flap was drawn incorporating the defect and placing the expected incisions within the relaxed skin tension lines where possible.    The area thus outlined was incised deep to adipose tissue with a #15 scalpel blade.  The skin margins were undermined to an appropriate distance in all directions utilizing iris scissors.
Complex Repair And Double M Plasty Text: The defect edges were debeveled with a #15 scalpel blade.  The primary defect was closed partially with a complex linear closure.  Given the location of the remaining defect, shape of the defect and the proximity to free margins a double M plasty was deemed most appropriate for complete closure of the defect.  Using a sterile surgical marker, an appropriate advancement flap was drawn incorporating the defect and placing the expected incisions within the relaxed skin tension lines where possible.    The area thus outlined was incised deep to adipose tissue with a #15 scalpel blade.  The skin margins were undermined to an appropriate distance in all directions utilizing iris scissors.
Complex Repair And W Plasty Text: The defect edges were debeveled with a #15 scalpel blade.  The primary defect was closed partially with a complex linear closure.  Given the location of the remaining defect, shape of the defect and the proximity to free margins a W plasty was deemed most appropriate for complete closure of the defect.  Using a sterile surgical marker, an appropriate advancement flap was drawn incorporating the defect and placing the expected incisions within the relaxed skin tension lines where possible.    The area thus outlined was incised deep to adipose tissue with a #15 scalpel blade.  The skin margins were undermined to an appropriate distance in all directions utilizing iris scissors.
Complex Repair And Z Plasty Text: The defect edges were debeveled with a #15 scalpel blade.  The primary defect was closed partially with a complex linear closure.  Given the location of the remaining defect, shape of the defect and the proximity to free margins a Z plasty was deemed most appropriate for complete closure of the defect.  Using a sterile surgical marker, an appropriate advancement flap was drawn incorporating the defect and placing the expected incisions within the relaxed skin tension lines where possible.    The area thus outlined was incised deep to adipose tissue with a #15 scalpel blade.  The skin margins were undermined to an appropriate distance in all directions utilizing iris scissors.
Complex Repair And Dorsal Nasal Flap Text: The defect edges were debeveled with a #15 scalpel blade.  The primary defect was closed partially with a complex linear closure.  Given the location of the remaining defect, shape of the defect and the proximity to free margins a dorsal nasal flap was deemed most appropriate for complete closure of the defect.  Using a sterile surgical marker, an appropriate flap was drawn incorporating the defect and placing the expected incisions within the relaxed skin tension lines where possible.    The area thus outlined was incised deep to adipose tissue with a #15 scalpel blade.  The skin margins were undermined to an appropriate distance in all directions utilizing iris scissors.
Complex Repair And Ftsg Text: The defect edges were debeveled with a #15 scalpel blade.  The primary defect was closed partially with a complex linear closure.  Given the location of the defect, shape of the defect and the proximity to free margins a full thickness skin graft was deemed most appropriate to repair the remaining defect.  The graft was trimmed to fit the size of the remaining defect.  The graft was then placed in the primary defect, oriented appropriately, and sutured into place.
Complex Repair And Burow's Graft Text: The defect edges were debeveled with a #15 scalpel blade.  The primary defect was closed partially with a complex linear closure.  Given the location of the defect, shape of the defect, the proximity to free margins and the presence of a standing cone deformity a Burow's graft was deemed most appropriate to repair the remaining defect.  The graft was trimmed to fit the size of the remaining defect.  The graft was then placed in the primary defect, oriented appropriately, and sutured into place.
Complex Repair And Split-Thickness Skin Graft Text: The defect edges were debeveled with a #15 scalpel blade.  The primary defect was closed partially with a complex linear closure.  Given the location of the defect, shape of the defect and the proximity to free margins a split thickness skin graft was deemed most appropriate to repair the remaining defect.  The graft was trimmed to fit the size of the remaining defect.  The graft was then placed in the primary defect, oriented appropriately, and sutured into place.
Complex Repair And Epidermal Autograft Text: The defect edges were debeveled with a #15 scalpel blade.  The primary defect was closed partially with a complex linear closure.  Given the location of the defect, shape of the defect and the proximity to free margins an epidermal autograft was deemed most appropriate to repair the remaining defect.  The graft was trimmed to fit the size of the remaining defect.  The graft was then placed in the primary defect, oriented appropriately, and sutured into place.
Complex Repair And Dermal Autograft Text: The defect edges were debeveled with a #15 scalpel blade.  The primary defect was closed partially with a complex linear closure.  Given the location of the defect, shape of the defect and the proximity to free margins an dermal autograft was deemed most appropriate to repair the remaining defect.  The graft was trimmed to fit the size of the remaining defect.  The graft was then placed in the primary defect, oriented appropriately, and sutured into place.
Complex Repair And Tissue Cultured Epidermal Autograft Text: The defect edges were debeveled with a #15 scalpel blade.  The primary defect was closed partially with a complex linear closure.  Given the location of the defect, shape of the defect and the proximity to free margins an tissue cultured epidermal autograft was deemed most appropriate to repair the remaining defect.  The graft was trimmed to fit the size of the remaining defect.  The graft was then placed in the primary defect, oriented appropriately, and sutured into place.
Complex Repair And Xenograft Text: The defect edges were debeveled with a #15 scalpel blade.  The primary defect was closed partially with a complex linear closure.  Given the location of the defect, shape of the defect and the proximity to free margins a xenograft was deemed most appropriate to repair the remaining defect.  The graft was trimmed to fit the size of the remaining defect.  The graft was then placed in the primary defect, oriented appropriately, and sutured into place.
Complex Repair And Skin Substitute Graft Text: The defect edges were debeveled with a #15 scalpel blade.  The primary defect was closed partially with a complex linear closure.  Given the location of the remaining defect, shape of the defect and the proximity to free margins a skin substitute graft was deemed most appropriate to repair the remaining defect.  The graft was trimmed to fit the size of the remaining defect.  The graft was then placed in the primary defect, oriented appropriately, and sutured into place.
Path Notes (To The Dermatopathologist): Please check margins.
Consent was obtained from the patient. The risks and benefits to therapy were discussed in detail. Specifically, the risks of infection, scarring, bleeding, prolonged wound healing, incomplete removal, allergy to anesthesia, nerve injury and recurrence were addressed. Prior to the procedure, the treatment site was clearly identified and confirmed by the patient. All components of Universal Protocol/PAUSE Rule completed.
Post-Care Instructions: I reviewed with the patient in detail post-care instructions:\\n1. Apply bacitracin over the steri-strips.  \\n2. Cut non-stick pad (Telfa) to cover the steri-strips\\n3. Apply tape (hypafix) over the non-stick pad\\n4. Change once per day for 5 days\\n5. Shower with bandage on, change bandage after shower\\n\\nPatient is not to engage in any heavy lifting, exercise, hot tub, or swimming for the next 14 days. Should the patient develop any fevers, chills, bleeding, severe pain patient will contact the office immediately.
Home Suture Removal Text: Patient was provided a home suture removal kit and will remove their sutures at home.  If they have any questions or difficulties they will call the office.
Where Do You Want The Question To Include Opioid Counseling Located?: Case Summary Tab
Billing Type: Third-Party Bill
Information: Selecting Yes will display possible errors in your note based on the variables you have selected. This validation is only offered as a suggestion for you. PLEASE NOTE THAT THE VALIDATION TEXT WILL BE REMOVED WHEN YOU FINALIZE YOUR NOTE. IF YOU WANT TO FAX A PRELIMINARY NOTE YOU WILL NEED TO TOGGLE THIS TO 'NO' IF YOU DO NOT WANT IT IN YOUR FAXED NOTE.

## 2020-07-22 ENCOUNTER — HOSPITAL ENCOUNTER (OUTPATIENT)
Dept: LAB | Facility: MEDICAL CENTER | Age: 65
End: 2020-07-22
Attending: DERMATOLOGY
Payer: COMMERCIAL

## 2020-07-22 ENCOUNTER — APPOINTMENT (RX ONLY)
Dept: URBAN - METROPOLITAN AREA CLINIC 4 | Facility: CLINIC | Age: 65
Setting detail: DERMATOLOGY
End: 2020-07-22

## 2020-07-22 DIAGNOSIS — Z48.817 ENCOUNTER FOR SURGICAL AFTERCARE FOLLOWING SURGERY ON THE SKIN AND SUBCUTANEOUS TISSUE: ICD-10-CM

## 2020-07-22 PROCEDURE — 87205 SMEAR GRAM STAIN: CPT

## 2020-07-22 PROCEDURE — 99212 OFFICE O/P EST SF 10 MIN: CPT

## 2020-07-22 PROCEDURE — ? POST-OP WOUND EVALUATION

## 2020-07-22 PROCEDURE — 87070 CULTURE OTHR SPECIMN AEROBIC: CPT

## 2020-07-22 PROCEDURE — 87077 CULTURE AEROBIC IDENTIFY: CPT

## 2020-07-22 PROCEDURE — ? PRESCRIPTION

## 2020-07-22 PROCEDURE — ? ORDER TESTS

## 2020-07-22 PROCEDURE — 87186 SC STD MICRODIL/AGAR DIL: CPT

## 2020-07-22 RX ORDER — DOXYCYCLINE HYCLATE 100 MG/1
TABLET, COATED ORAL BID
Qty: 20 | Refills: 0 | Status: ERX | COMMUNITY
Start: 2020-07-22

## 2020-07-22 RX ADMIN — DOXYCYCLINE HYCLATE: 100 TABLET, COATED ORAL at 00:00

## 2020-07-22 ASSESSMENT — LOCATION SIMPLE DESCRIPTION DERM: LOCATION SIMPLE: CHEST

## 2020-07-22 ASSESSMENT — LOCATION DETAILED DESCRIPTION DERM: LOCATION DETAILED: RIGHT MEDIAL SUPERIOR CHEST

## 2020-07-22 ASSESSMENT — LOCATION ZONE DERM: LOCATION ZONE: TRUNK

## 2020-07-22 NOTE — PROCEDURE: ORDER TESTS
Billing Type: Third-Party Bill
Bill For Surgical Tray: no
Performing Laboratory: [id number]
Expected Date Of Service: 07/22/2020

## 2020-07-22 NOTE — PROCEDURE: POST-OP WOUND EVALUATION
Detail Level: Detailed
Wound Evaluated By (Optional): Dr. Stuart
Wound Diameter In Cm(Optional): 0
Wound Crusting?: clean
Sutures?: intact
Wound Color?: pink
Patient To Follow-Up With?: our clinic

## 2020-07-23 LAB
GRAM STN SPEC: NORMAL
SIGNIFICANT IND 70042: NORMAL
SITE SITE: NORMAL
SOURCE SOURCE: NORMAL

## 2020-07-25 LAB
BACTERIA WND AEROBE CULT: ABNORMAL
BACTERIA WND AEROBE CULT: ABNORMAL
GRAM STN SPEC: ABNORMAL
SIGNIFICANT IND 70042: ABNORMAL
SITE SITE: ABNORMAL
SOURCE SOURCE: ABNORMAL

## 2020-08-31 ENCOUNTER — SLEEP CENTER VISIT (OUTPATIENT)
Dept: SLEEP MEDICINE | Facility: MEDICAL CENTER | Age: 65
End: 2020-08-31
Payer: COMMERCIAL

## 2020-08-31 VITALS
OXYGEN SATURATION: 93 % | HEART RATE: 72 BPM | SYSTOLIC BLOOD PRESSURE: 130 MMHG | WEIGHT: 207 LBS | HEIGHT: 65 IN | RESPIRATION RATE: 16 BRPM | DIASTOLIC BLOOD PRESSURE: 64 MMHG | BODY MASS INDEX: 34.49 KG/M2

## 2020-08-31 DIAGNOSIS — Z87.891 FORMER SMOKER: ICD-10-CM

## 2020-08-31 DIAGNOSIS — G47.33 OBSTRUCTIVE SLEEP APNEA: Chronic | ICD-10-CM

## 2020-08-31 DIAGNOSIS — F51.04 PSYCHOPHYSIOLOGICAL INSOMNIA: Chronic | ICD-10-CM

## 2020-08-31 PROCEDURE — 99214 OFFICE O/P EST MOD 30 MIN: CPT | Performed by: NURSE PRACTITIONER

## 2020-08-31 ASSESSMENT — FIBROSIS 4 INDEX: FIB4 SCORE: 1.23

## 2020-08-31 NOTE — PROGRESS NOTES
Chief Complaint   Patient presents with   • Apnea     Last Seen 10/15/19        HPI:  Acacia Arboleda is a 65 y.o. year old female here today for follow-up on JAYSHREE and hx of insomnia.  Last OV 10/15/19.     Prior polysomnogram 1/27/15 indicated severe sleep apnea with AHI of 85. She had lost a significant amount of weight last year, but has regained 34lbs. BMI 34.  She was previously using BIPAP 16/14cm and pressures adjusted to 14/10cm instead.  Compliance report 8/1/20-8/30/20 notes 96.7% compliance, avg nightly use of 6hr 56min, minimal mask leak with overall AHI 6.9/hr. Last years report noted AHi 3.4/hr. She may benefit from pressure increase due to weight gain. Reviewed findings with patient.  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 She is travelling back and forth to [place]. She is moving/lifting stuff in order to move. She is planning moving. She denies cardiac or respiratory symptoms.     She does have a history of insomnia. She has been seen by Dr. roque for CBT therapy with benefit.  She has successfully weaned off Lunesta and uses melatonin nightly.  She has tried Ambien and Belsomra in the past without subjective benefit. She has used Tylenol PM for pain to help sleep and sometimes melatonin.    ROS: As per HPI and otherwise negative if not stated.    Past Medical History:   Diagnosis Date   • Allergic rhinitis    • Arthritis    • Asthma    • Bowel habit changes     IBS   • Cancer (HCC) 1984    uterine   • Cataract     no surgery yet   • CHRONIC SINUSITIS 9/11/2009   • Diabetes (HCC)     diet controlled   • Dyslipidemia 9/11/2009   • GERD (gastroesophageal reflux disease) 9/11/2009   • Hemorrhoids    • Herpes 9/11/2009   • Hiatus hernia syndrome    • Hypothyroid 9/11/2009   • IBS (irritable bowel syndrome) 9/11/2009   • Pain     hands knees, hips   • Pneumonia 1986   • Sleep apnea     bipap   • Snoring 11/19/2013   • Urinary bladder disorder      prolapse       Past Surgical History:   Procedure Laterality Date   • ANTERIOR AND POSTERIOR REPAIR  2018    Procedure: ANTERIOR AND POSTERIOR REPAIR - COLPORRHAPHY;  Surgeon: Pierce Murrieta M.D.;  Location: SURGERY SAME DAY [facility];  Service: Gynecology   • VAGINAL SUSPENSION  2018    Procedure: VAGINAL SUSPENSION - UTEROSACRAL LIGAMENT;  Surgeon: Pierce Murrieta M.D.;  Location: SURGERY SAME DAY [facility];  Service: Gynecology   • CHOLECYSTECTOMY     • GYN SURGERY      hysterectomy   • GYN SURGERY      fertility surgery?   • GYN SURGERY      tubal reconstruction   • APPENDECTOMY     • GYN SURGERY  ,,    tubal pregnancy   • OTHER      RECTAL SPINCTER,;ASHLEY,;TUBAL PREGNANCY &;CRYOSURGERY        Family History   Problem Relation Age of Onset   • Heart Disease Father         AAA   • Cancer Mother         UTERINE   • Heart Disease Mother         AFIB   • Cancer Maternal Aunt         BREAST   • Cancer Maternal Uncle         COLON       Social History     Socioeconomic History   • Marital status:      Spouse name: Not on file   • Number of children: Not on file   • Years of education: Not on file   • Highest education level: Not on file   Occupational History   • Not on file   Social Needs   • Financial resource strain: Not on file   • Food insecurity     Worry: Not on file     Inability: Not on file   • Transportation needs     Medical: Not on file     Non-medical: Not on file   Tobacco Use   • Smoking status: Former Smoker     Packs/day: 3.00     Years: 20.00     Pack years: 60.00     Types: Cigarettes     Quit date: 1986     Years since quittin.7   • Smokeless tobacco: Never Used   • Tobacco comment: QUIT    Substance and Sexual Activity   • Alcohol use: No     Alcohol/week: 0.0 oz   • Drug use: No   • Sexual activity: Never   Lifestyle   • Physical activity     Days per week: Not on file     Minutes per session: Not on file   •  "Stress: Not on file   Relationships   • Social connections     Talks on phone: Not on file     Gets together: Not on file     Attends [employer] service: Not on file     Active member of club or organization: Not on file     Attends meetings of clubs or organizations: Not on file     Relationship status: Not on file   • Intimate partner violence     Fear of current or ex partner: Not on file     Emotionally abused: Not on file     Physically abused: Not on file     Forced sexual activity: Not on file   Other Topics Concern   • Not on file   Social History Narrative   • Not on file       Allergies as of 08/31/2020 - Reviewed 08/31/2020   Allergen Reaction Noted   • Sulfa drugs Shortness of Breath, Rash, and Swelling 09/11/2009        Vitals:  /64 (BP Location: Left arm, Patient Position: Sitting, BP Cuff Size: Adult)   Pulse 72   Resp 16   Ht 1.651 m (5' 5\")   Wt 93.9 kg (207 lb)   SpO2 93%     Current medications as of today   Current Outpatient Medications   Medication Sig Dispense Refill   • levothyroxine (SYNTHROID) 137 MCG Tab TAKE 1 TABLET BY MOUTH DAILY 90 Tab 0   • Melatonin 5 MG Tab Take 5 mg by mouth.     • loratadine (CLARITIN) 10 MG TABS Take 10 mg by mouth every day.       No current facility-administered medications for this visit.          Physical Exam:   Gen:           Alert and oriented, No apparent distress. Mood and affect appropriate, normal interaction with examiner.  Eyes:          PERRL, EOM intact, sclere white, conjunctive moist. Glasses.  Ears:          Not examined.   Hearing:     Grossly intact.  Nose:          Normal, no lesions or deformities.  Dentition:    Good dentition.  Oropharynx:   mask  Mallampati Classification: mask  Neck:        Supple, trachea midline, no masses.  Respiratory Effort: No intercostal retractions or use of accessory muscles.   Lung Auscultation:      Clear to auscultation bilaterally; no rales, rhonchi or wheezing.  CV:            Regular rate and " rhythm. No murmurs, rubs or gallops.  Abd:           Not examined.   Lymphadenopathy: Not examined.  Gait and Station: Normal.  Digits and Nails: No clubbing, cyanosis, petechiae, or nodes.   Cranial Nerves: II-XII grossly intact.  Skin:        No rashes, lesions or ulcers noted.               Ext:           No cyanosis or edema.      Assessment:  1. Obstructive sleep apnea     2. Psychophysiological insomnia     3. BMI 34.0-34.9,adult  Height And Weight   4. Former smoker         Immunizations:    Flu:10/2019  Pneumovax 23:10/2018  Prevnar 13:due age 66    Plan:  1. JAYSHREE is clinically stable but patient would benefit from empirical pressure increase due to weight gain.  DME other; BIPAP 16/12cm adjusted in office.  Patient was given hand carry copies of her RX for her move including:  DME mask/supplies  DME BIPAP with settings  2. Discussed sleep hygiene  3. Encouraged weight loss  4. F/u as needed, or in 1 year due to moving, sooner if needed.    Please note that this dictation was created using voice recognition software. I have made every reasonable attempt to correct obvious errors, but it is possible there are errors of grammar and possibly content that I did not discover before finalizing the note.

## 2020-09-14 ENCOUNTER — TELEPHONE (OUTPATIENT)
Dept: MEDICAL GROUP | Facility: PHYSICIAN GROUP | Age: 65
End: 2020-09-14

## 2020-09-14 DIAGNOSIS — E03.9 HYPOTHYROIDISM, UNSPECIFIED TYPE: ICD-10-CM

## 2020-09-14 RX ORDER — LEVOTHYROXINE SODIUM 137 UG/1
TABLET ORAL
Qty: 90 TAB | Refills: 0 | Status: SHIPPED | OUTPATIENT
Start: 2020-09-14 | End: 2020-09-14 | Stop reason: SDUPTHER

## 2020-09-14 RX ORDER — LEVOTHYROXINE SODIUM 137 UG/1
TABLET ORAL
Qty: 90 TAB | Refills: 0 | Status: SHIPPED | OUTPATIENT
Start: 2020-09-14 | End: 2020-11-06

## 2020-11-04 DIAGNOSIS — E03.9 HYPOTHYROIDISM, UNSPECIFIED TYPE: ICD-10-CM

## 2020-11-06 RX ORDER — LEVOTHYROXINE SODIUM 137 UG/1
TABLET ORAL
Qty: 90 TAB | Refills: 0 | Status: SHIPPED | OUTPATIENT
Start: 2020-11-06 | End: 2021-02-01 | Stop reason: SDUPTHER

## 2020-11-06 NOTE — TELEPHONE ENCOUNTER
Last seen by PCP 04/29/2020.     TSH   Date Value Ref Range Status   01/27/2020 0.040 (L) 0.380 - 5.330 uIU/mL Final     Comment:     Please note new reference ranges effective 12/14/2017 10:00 AM  Pregnant Females, 1st Trimester  0.050-3.700  Pregnant Females, 2nd Trimester  0.310-4.350  Pregnant Females, 3rd Trimester  0.410-5.180

## 2021-02-01 ENCOUNTER — TELEPHONE (OUTPATIENT)
Dept: MEDICAL GROUP | Facility: PHYSICIAN GROUP | Age: 66
End: 2021-02-01

## 2021-02-01 DIAGNOSIS — E03.9 HYPOTHYROIDISM, UNSPECIFIED TYPE: ICD-10-CM

## 2021-02-01 RX ORDER — LEVOTHYROXINE SODIUM 137 UG/1
TABLET ORAL
Qty: 90 TAB | Refills: 0 | Status: SHIPPED | OUTPATIENT
Start: 2021-02-01 | End: 2021-04-16

## 2021-04-15 DIAGNOSIS — E03.9 HYPOTHYROIDISM, UNSPECIFIED TYPE: ICD-10-CM

## 2021-04-16 RX ORDER — LEVOTHYROXINE SODIUM 137 UG/1
TABLET ORAL
Qty: 30 TABLET | Refills: 3 | Status: SHIPPED | OUTPATIENT
Start: 2021-04-16 | End: 2021-07-08

## 2021-04-17 NOTE — TELEPHONE ENCOUNTER
Last seen by PCP 04/29/2020.     TSH   Date Value Ref Range Status   01/27/2020 0.040 (L) 0.380 - 5.330 uIU/mL Final     Comment:     Please note new reference ranges effective 12/14/2017 10:00 AM  Pregnant Females, 1st Trimester  0.050-3.700  Pregnant Females, 2nd Trimester  0.310-4.350  Pregnant Females, 3rd Trimester  0.410-5.180       Pt to make appt prior to more refills.

## 2021-07-05 DIAGNOSIS — E03.9 HYPOTHYROIDISM, UNSPECIFIED TYPE: ICD-10-CM

## 2021-07-08 RX ORDER — LEVOTHYROXINE SODIUM 137 UG/1
TABLET ORAL
Qty: 30 TABLET | Refills: 0 | Status: SHIPPED
Start: 2021-07-08

## 2021-07-08 NOTE — TELEPHONE ENCOUNTER
Pt was told 2/21 to make appt and that has not been done. Please advise pt only 30 days will be sent to pharmacy and next request will be denied.

## 2021-08-19 DIAGNOSIS — E03.9 HYPOTHYROIDISM, UNSPECIFIED TYPE: ICD-10-CM

## 2021-08-23 RX ORDER — LEVOTHYROXINE SODIUM 137 UG/1
TABLET ORAL
Qty: 30 TABLET | Refills: 11 | OUTPATIENT
Start: 2021-08-23

## 2022-01-11 NOTE — PROGRESS NOTES
Chief Complaint   Patient presents with   • Other     IBS       HISTORY OF PRESENT ILLNESS: Patient is a 63 y.o. female new patient who presents today to discuss the following issues:    Encounter to establish care with new doctor  Is here to establish with a new primary care provider.  Was previously seen by Amada Lanza.    Diverticulitis of large intestine without perforation or abscess without bleeding  Recent CT shows that there has been significant improvement since her hospitalization.  She has an appointment with her GI today.    Vaginal prolapse  Will schedule an appointment with her GYN to discuss vaginal prolapse.    Hypothyroid  Chronic in nature.  Stable on meds.  Due for refills.      Patient Active Problem List    Diagnosis Date Noted   • Vaginal prolapse 06/25/2018   • Encounter to establish care with new doctor 06/21/2018   • Hemorrhoids 12/29/2017   • Diarrhea 12/29/2017   • Diverticulitis of large intestine without perforation or abscess without bleeding 12/19/2017   • Obesity (BMI 30-39.9) 11/16/2017   • Insomnia 05/16/2016   • Obstructive sleep apnea 04/13/2016   • Snoring 11/19/2013   • Dyslipidemia 09/11/2009   • Hypothyroid 09/11/2009   • IBS (irritable bowel syndrome) 09/11/2009   • GERD (gastroesophageal reflux disease) 09/11/2009   • Herpes 09/11/2009   • CHRONIC SINUSITIS 09/11/2009       Allergies:Sulfa drugs    Current Outpatient Prescriptions   Medication Sig Dispense Refill   • levothyroxine (SYNTHROID) 175 MCG Tab TAKE 1 TABLET BY MOUTH  DAILY 90 Tab 3   • Propylene Glycol (SYSTANE BALANCE) 0.6 % Solution by Ophthalmic route.     • Melatonin 5 MG Cap Take  by mouth.     • loratadine (CLARITIN) 10 MG TABS Take 10 mg by mouth every day.     • mometasone (NASONEX) 50 MCG/ACT nasal spray Spray 2 Sprays in nose every day. Each nostril 1 Inhaler 2   • hydrOXYzine HCl (ATARAX) 25 MG Tab Take 1 Tab by mouth 3 times a day as needed for Itching. 30 Tab 0   • acyclovir (ZOVIRAX) 400 MG  Patient ate 3/4 of dinner tray   tablet Take 1 Tab by mouth 3 times a day. 21 Tab 0   • MAGNESIUM PO Take  by mouth.     • Multiple Vitamins-Minerals (WOMENS 50+ MULTI VITAMIN/MIN PO) Take  by mouth.     • VITAMIN E by Does not apply route.     • Multiple Vitamins-Minerals (OCUVITE) TABS Take 1 Tab by mouth every day.     • Naproxen Sodium (ALEVE PO) Take  by mouth.     • NON SPECIFIED DIABETIC TEST STRIP (ONE TOUCH ULTRA) TEST 2-3 TIMES DAILY        No current facility-administered medications for this visit.        Social History   Substance Use Topics   • Smoking status: Former Smoker     Packs/day: 3.00     Types: Cigarettes   • Smokeless tobacco: Never Used      Comment: QUIT    • Alcohol use No       Family Status   Relation Status   • Father Alive   • Mother    • Maternal Aunt    • Maternal Uncle      Family History   Problem Relation Age of Onset   • Heart Disease Father      AAA   • Cancer Mother      UTERINE   • Heart Disease Mother      AFIB   • Cancer Maternal Aunt      BREAST   • Cancer Maternal Uncle      COLON       Review of Systems:   Constitutional: Negative for fever, chills, weight loss and malaise/fatigue.   HENT: Negative for ear pain, nosebleeds, congestion, sore throat and neck pain.    Eyes: Negative for blurred vision.   Respiratory: Negative for cough, sputum production, shortness of breath and wheezing.    Cardiovascular: Negative for chest pain, palpitations, orthopnea and leg swelling.   Gastrointestinal: Negative for heartburn, nausea, vomiting and abdominal pain.   Genitourinary: Negative for dysuria, urgency and frequency. Positive for intermittent vaginal prolapse.  Musculoskeletal: Negative for myalgias, joint pain, and back pain.  Skin: Negative for rash and itching.   Neurological: Negative for dizziness, tingling, tremors, sensory change, focal weakness and headaches.   Endo/Heme/Allergies: Does not bruise/bleed easily.   Psychiatric/Behavioral: Negative for depression, suicidal ideas and memory  "loss.  The patient is not nervous/anxious and does not have insomnia.    All other systems reviewed and are negative except as in HPI.    Exam:  Blood pressure 120/70, pulse 66, temperature 36.4 °C (97.6 °F), resp. rate 16, height 1.651 m (5' 5\"), weight 80.3 kg (177 lb), last menstrual period 03/01/1985, SpO2 96 %.  General:  Well nourished, well developed female in NAD  Head: Grossly normal.  Neck: Supple without JVD or bruit. Thyroid is not enlarged.  Pulmonary: Clear to ausculation. Normal effort. No rales, ronchi, or wheezing.  Cardiovascular: Regular rate and rhythm without murmur.   Abdomen:  Bowel sounds + x 4. Soft, non-tender, nondistended.  Extremities: No clubbing, cyanosis, or edema.  Skin: Intact with no obvious rashes or lesions.  Neuro: Grossly intact.  Psych: Alert and oriented x 3.  Mood and affect appropriate.    Medical decision-making and discussion: Acacia is here to establish with a new primary care provider.  We reviewed her past medical history and discussed her current medications.  Her synthroid was refilled, and she was encouraged to schedule an appointment with her GYN. She will sign a records release for her previous provider, she will sign up with [facility], and she will plan to follow-up here as needed.         Assessment/Plan:  1. Encounter to establish care with new doctor     2. Diverticulitis of large intestine without perforation or abscess without bleeding     3. Vaginal prolapse     4. Hypothyroidism, unspecified type  levothyroxine (SYNTHROID) 175 MCG Tab       Return if symptoms worsen or fail to improve.    Please note that this dictation was created using voice recognition software. I have made every reasonable attempt to correct obvious errors, but I expect that there are errors of grammar and possibly content that I did not discover before finalizing the note.    "

## 2022-12-21 NOTE — TELEPHONE ENCOUNTER
Have we ever prescribed this med? Yes.  If yes, what date? 3/6/17    Last OV: 2/10/17    Next OV: 8/16/17    DX: Insomnia unspecified    Medications:  Current Outpatient Prescriptions   Medication Sig Dispense Refill   • Eszopiclone 3 MG Tab TAKE ONE TABLET BY MOUTH AT BEDTIME AS NEEDED FOR SLEEP 30 Tab 2   • DOCUSATE SODIUM PO Take  by mouth.     • atorvastatin (LIPITOR) 10 MG Tab Take 1 Tab by mouth every day. 90 Tab 0   • levothyroxine (SYNTHROID) 175 MCG Tab Take 1 Tab by mouth every day. 90 Tab 1   • acyclovir (ZOVIRAX) 400 MG tablet Take 1 Tab by mouth 3 times a day. 21 Tab 0   • Benzocaine-Benzethonium 20-0.2 % Aerosol Apply as needed 1 Can 3   • loratadine (CLARITIN) 10 MG TABS Take 10 mg by mouth every day.     • MAGNESIUM PO Take  by mouth.     • Multiple Vitamins-Minerals (WOMENS 50+ MULTI VITAMIN/MIN PO) Take  by mouth.     • ascorbic acid (ASCORBIC ACID) 500 MG TABS Take 500 mg by mouth every day.     • VITAMIN E by Does not apply route.     • docosahexanoic acid (OMEGA 3 FA) 1000 MG CAPS Take 1,000 mg by mouth 3 times a day, with meals.     • Calcium Carbonate-Vitamin D (CALCIUM + D PO) Take  by mouth.     • Multiple Vitamins-Minerals (OCUVITE) TABS Take 1 Tab by mouth every day.     • Coenzyme Q10 (CO Q 10) 10 MG CAPS Take  by mouth.     • Cyanocobalamin (VITAMIN B12 PO) Take  by mouth.     • Naproxen Sodium (ALEVE PO) Take  by mouth.     • mometasone (NASONEX) 50 MCG/ACT nasal spray Spray 2 Sprays in nose every day. Each nostril 1 Inhaler 2   • NON SPECIFIED DIABETIC TEST STRIP (ONE TOUCH ULTRA) TEST 2-3 TIMES DAILY        No current facility-administered medications for this visit.     
Rx faxed to [facility] per Patient would like RX faxed to [facility]   I called [facility] to discard RX.    [facility] - [place], [place] - [address] [place], [place] [address] [facility], located within [facility]  [id number]  [facility] [address]  Phone: [phone] Fax: [phone]          
No indicators present

## (undated) DEVICE — TOWELS CLOTH SURGICAL - (4/PK 20PK/CA)

## (undated) DEVICE — KIT  I.V. START (100EA/CA)

## (undated) DEVICE — SET LEADWIRE 5 LEAD BEDSIDE DISPOSABLE ECG (1SET OF 5/EA)

## (undated) DEVICE — CATHETER IV 20 GA X 1-1/4 ---SURG.& SDS ONLY--- (50EA/BX)

## (undated) DEVICE — SLEEVE, VASO, THIGH, MED

## (undated) DEVICE — ELECTRODE 850 FOAM ADHESIVE - HYDROGEL RADIOTRNSPRNT (50/PK)

## (undated) DEVICE — WATER IRRIGATION STERILE 1000ML (12EA/CA)

## (undated) DEVICE — ELECTRODE DUAL RETURN W/ CORD - (50/PK)

## (undated) DEVICE — PACKING VAG 2 X-RAY (24EA/CS)

## (undated) DEVICE — BLADE SURGICAL CLIPPER - (50EA/CA)

## (undated) DEVICE — GOWN WARMING STANDARD FLEX - (30/CA)

## (undated) DEVICE — SUTURE 0 PDS CT-2 (36PK/BX)

## (undated) DEVICE — NEPTUNE 4 PORT MANIFOLD - (20/PK)

## (undated) DEVICE — SUTURE GENERAL

## (undated) DEVICE — HEAD HOLDER JUNIOR/ADULT

## (undated) DEVICE — KIT ANESTHESIA W/CIRCUIT & 3/LT BAG W/FILTER (20EA/CA)

## (undated) DEVICE — TUBE CONNECTING SUCTION - CLEAR PLASTIC STERILE 72 IN (50EA/CA)

## (undated) DEVICE — TUBING CLEARLINK DUO-VENT - C-FLO (48EA/CA)

## (undated) DEVICE — SUTURE 2-0 VICRYL PLUS CT-2 - 27 INCH (36/BX)

## (undated) DEVICE — Device

## (undated) DEVICE — TRAY SRGPRP PVP IOD WT PRP - (20/CA)

## (undated) DEVICE — SUTURE 0 VICRYL PLUS CT-2 - 27 INCH (36/BX)

## (undated) DEVICE — DRAPE LAP PELVISCOPY - (10/CA)

## (undated) DEVICE — DRAPE VAGINAL BIB W/ POUCH (10EA/CA)

## (undated) DEVICE — PAD SANITARY 11IN MAXI IND WRAPPED  (12EA/PK 24PK/CA)

## (undated) DEVICE — PROTECTOR ULNA NERVE - (36PR/CA)

## (undated) DEVICE — LACTATED RINGERS INJ 1000 ML - (14EA/CA 60CA/PF)

## (undated) DEVICE — CANISTER SUCTION 3000ML MECHANICAL FILTER AUTO SHUTOFF MEDI-VAC NONSTERILE LF DISP  (40EA/CA)

## (undated) DEVICE — SLEEVE STERILE  A599T - 30/BX 2BX/CS

## (undated) DEVICE — MASK ANESTHESIA ADULT  - (100/CA)

## (undated) DEVICE — GLOVE BIOGEL SZ 7.5 SURGICAL PF LTX - (50PR/BX 4BX/CA)

## (undated) DEVICE — SODIUM CHL IRRIGATION 0.9% 1000ML (12EA/CA)

## (undated) DEVICE — PAD BABY LAP 4X18 W/O - RINGS PREWASHED 5/PK 40PK/CS

## (undated) DEVICE — TRAY FOLEY CATHETER STATLOCK 16FR SURESTEP  (10EA/CA)

## (undated) DEVICE — SUCTION INSTRUMENT YANKAUER BULBOUS TIP W/O VENT (50EA/CA)

## (undated) DEVICE — CANISTER SUCTION RIGID RED 1500CC (40EA/CA)

## (undated) DEVICE — NEEDLE DAVIS TONSIL 1/2 CIR - (2EA/PK20PK/BX)

## (undated) DEVICE — SENSOR SPO2 NEO LNCS ADHESIVE (20/BX) SEE USER NOTES